# Patient Record
Sex: MALE | Race: WHITE | Employment: UNEMPLOYED | URBAN - METROPOLITAN AREA
[De-identification: names, ages, dates, MRNs, and addresses within clinical notes are randomized per-mention and may not be internally consistent; named-entity substitution may affect disease eponyms.]

---

## 2018-12-30 ENCOUNTER — HOSPITAL ENCOUNTER (INPATIENT)
Age: 23
LOS: 3 days | Discharge: PSYCHIATRIC OR SUBSTANCE USE FACILITY | DRG: 918 | End: 2019-01-02
Attending: EMERGENCY MEDICINE | Admitting: INTERNAL MEDICINE
Payer: COMMERCIAL

## 2018-12-30 DIAGNOSIS — R45.851 SUICIDAL IDEATION: ICD-10-CM

## 2018-12-30 DIAGNOSIS — T50.902A INTENTIONAL DRUG OVERDOSE, INITIAL ENCOUNTER (HCC): Primary | ICD-10-CM

## 2018-12-30 PROBLEM — T50.901A OVERDOSE: Status: ACTIVE | Noted: 2018-12-30

## 2018-12-30 LAB
ALBUMIN SERPL-MCNC: 4.2 G/DL (ref 3.5–5)
ALBUMIN/GLOB SERPL: 1.1 {RATIO} (ref 1.1–2.2)
ALP SERPL-CCNC: 98 U/L (ref 45–117)
ALT SERPL-CCNC: 19 U/L (ref 12–78)
AMPHET UR QL SCN: POSITIVE
ANION GAP SERPL CALC-SCNC: 8 MMOL/L (ref 5–15)
APAP SERPL-MCNC: <2 UG/ML (ref 10–30)
AST SERPL-CCNC: 14 U/L (ref 15–37)
ATRIAL RATE: 103 BPM
BARBITURATES UR QL SCN: NEGATIVE
BASOPHILS # BLD: 0 K/UL (ref 0–0.1)
BASOPHILS NFR BLD: 0 % (ref 0–1)
BENZODIAZ UR QL: POSITIVE
BILIRUB SERPL-MCNC: 0.7 MG/DL (ref 0.2–1)
BUN SERPL-MCNC: 12 MG/DL (ref 6–20)
BUN/CREAT SERPL: 13 (ref 12–20)
CALCIUM SERPL-MCNC: 8.8 MG/DL (ref 8.5–10.1)
CALCULATED P AXIS, ECG09: 69 DEGREES
CALCULATED R AXIS, ECG10: 71 DEGREES
CALCULATED T AXIS, ECG11: 54 DEGREES
CANNABINOIDS UR QL SCN: POSITIVE
CHLORIDE SERPL-SCNC: 102 MMOL/L (ref 97–108)
CK SERPL-CCNC: 226 U/L (ref 39–308)
CO2 SERPL-SCNC: 25 MMOL/L (ref 21–32)
COCAINE UR QL SCN: NEGATIVE
COMMENT, HOLDF: NORMAL
CREAT SERPL-MCNC: 0.92 MG/DL (ref 0.7–1.3)
DIAGNOSIS, 93000: NORMAL
DIFFERENTIAL METHOD BLD: ABNORMAL
DRUG SCRN COMMENT,DRGCM: ABNORMAL
EOSINOPHIL # BLD: 0 K/UL (ref 0–0.4)
EOSINOPHIL NFR BLD: 0 % (ref 0–7)
ERYTHROCYTE [DISTWIDTH] IN BLOOD BY AUTOMATED COUNT: 12.9 % (ref 11.5–14.5)
ETHANOL SERPL-MCNC: <10 MG/DL
GLOBULIN SER CALC-MCNC: 3.8 G/DL (ref 2–4)
GLUCOSE SERPL-MCNC: 105 MG/DL (ref 65–100)
HCT VFR BLD AUTO: 46.6 % (ref 36.6–50.3)
HGB BLD-MCNC: 15.6 G/DL (ref 12.1–17)
IMM GRANULOCYTES # BLD: 0 K/UL (ref 0–0.04)
IMM GRANULOCYTES NFR BLD AUTO: 0 % (ref 0–0.5)
LYMPHOCYTES # BLD: 1.2 K/UL (ref 0.8–3.5)
LYMPHOCYTES NFR BLD: 11 % (ref 12–49)
MAGNESIUM SERPL-MCNC: 1.9 MG/DL (ref 1.6–2.4)
MCH RBC QN AUTO: 30.1 PG (ref 26–34)
MCHC RBC AUTO-ENTMCNC: 33.5 G/DL (ref 30–36.5)
MCV RBC AUTO: 90 FL (ref 80–99)
METHADONE UR QL: NEGATIVE
MONOCYTES # BLD: 1.2 K/UL (ref 0–1)
MONOCYTES NFR BLD: 11 % (ref 5–13)
NEUTS SEG # BLD: 8.8 K/UL (ref 1.8–8)
NEUTS SEG NFR BLD: 78 % (ref 32–75)
NRBC # BLD: 0 K/UL (ref 0–0.01)
NRBC BLD-RTO: 0 PER 100 WBC
OPIATES UR QL: NEGATIVE
P-R INTERVAL, ECG05: 122 MS
PCP UR QL: NEGATIVE
PLATELET # BLD AUTO: 214 K/UL (ref 150–400)
PMV BLD AUTO: 10.8 FL (ref 8.9–12.9)
POTASSIUM SERPL-SCNC: 3.7 MMOL/L (ref 3.5–5.1)
PROT SERPL-MCNC: 8 G/DL (ref 6.4–8.2)
Q-T INTERVAL, ECG07: 296 MS
QRS DURATION, ECG06: 82 MS
QTC CALCULATION (BEZET), ECG08: 387 MS
RBC # BLD AUTO: 5.18 M/UL (ref 4.1–5.7)
SALICYLATES SERPL-MCNC: <1.7 MG/DL (ref 2.8–20)
SAMPLES BEING HELD,HOLD: NORMAL
SODIUM SERPL-SCNC: 135 MMOL/L (ref 136–145)
VENTRICULAR RATE, ECG03: 103 BPM
WBC # BLD AUTO: 11.4 K/UL (ref 4.1–11.1)

## 2018-12-30 PROCEDURE — 99218 HC RM OBSERVATION: CPT

## 2018-12-30 PROCEDURE — 93005 ELECTROCARDIOGRAM TRACING: CPT

## 2018-12-30 PROCEDURE — 80307 DRUG TEST PRSMV CHEM ANLYZR: CPT

## 2018-12-30 PROCEDURE — 96361 HYDRATE IV INFUSION ADD-ON: CPT

## 2018-12-30 PROCEDURE — 85025 COMPLETE CBC W/AUTO DIFF WBC: CPT

## 2018-12-30 PROCEDURE — 80053 COMPREHEN METABOLIC PANEL: CPT

## 2018-12-30 PROCEDURE — 96360 HYDRATION IV INFUSION INIT: CPT

## 2018-12-30 PROCEDURE — 99285 EMERGENCY DEPT VISIT HI MDM: CPT

## 2018-12-30 PROCEDURE — 74011250637 HC RX REV CODE- 250/637: Performed by: INTERNAL MEDICINE

## 2018-12-30 PROCEDURE — 74011250636 HC RX REV CODE- 250/636: Performed by: INTERNAL MEDICINE

## 2018-12-30 PROCEDURE — 65270000029 HC RM PRIVATE

## 2018-12-30 PROCEDURE — 94762 N-INVAS EAR/PLS OXIMTRY CONT: CPT

## 2018-12-30 PROCEDURE — 36415 COLL VENOUS BLD VENIPUNCTURE: CPT

## 2018-12-30 PROCEDURE — 82550 ASSAY OF CK (CPK): CPT

## 2018-12-30 PROCEDURE — 90791 PSYCH DIAGNOSTIC EVALUATION: CPT

## 2018-12-30 PROCEDURE — 74011000250 HC RX REV CODE- 250: Performed by: EMERGENCY MEDICINE

## 2018-12-30 PROCEDURE — 74011250636 HC RX REV CODE- 250/636: Performed by: EMERGENCY MEDICINE

## 2018-12-30 PROCEDURE — 83735 ASSAY OF MAGNESIUM: CPT

## 2018-12-30 RX ORDER — SODIUM CHLORIDE 0.9 % (FLUSH) 0.9 %
5-10 SYRINGE (ML) INJECTION EVERY 8 HOURS
Status: DISCONTINUED | OUTPATIENT
Start: 2018-12-30 | End: 2019-01-02 | Stop reason: HOSPADM

## 2018-12-30 RX ORDER — QUETIAPINE FUMARATE 25 MG/1
25-50 TABLET, FILM COATED ORAL
COMMUNITY
End: 2019-01-04

## 2018-12-30 RX ORDER — SODIUM CHLORIDE 0.9 % (FLUSH) 0.9 %
5-10 SYRINGE (ML) INJECTION AS NEEDED
Status: DISCONTINUED | OUTPATIENT
Start: 2018-12-30 | End: 2019-01-02 | Stop reason: HOSPADM

## 2018-12-30 RX ORDER — ALPRAZOLAM 0.5 MG/1
0.5 TABLET ORAL
COMMUNITY
End: 2019-01-04

## 2018-12-30 RX ORDER — ALPRAZOLAM 0.5 MG/1
0.5 TABLET ORAL
Status: DISCONTINUED | OUTPATIENT
Start: 2018-12-30 | End: 2018-12-31

## 2018-12-30 RX ORDER — ONDANSETRON 2 MG/ML
4 INJECTION INTRAMUSCULAR; INTRAVENOUS
Status: DISCONTINUED | OUTPATIENT
Start: 2018-12-30 | End: 2019-01-02 | Stop reason: HOSPADM

## 2018-12-30 RX ORDER — DEXTROAMPHETAMINE SACCHARATE, AMPHETAMINE ASPARTATE, DEXTROAMPHETAMINE SULFATE AND AMPHETAMINE SULFATE 1.25; 1.25; 1.25; 1.25 MG/1; MG/1; MG/1; MG/1
5 TABLET ORAL DAILY
COMMUNITY
End: 2019-01-02

## 2018-12-30 RX ADMIN — ALPRAZOLAM 0.5 MG: 0.25 TABLET ORAL at 20:10

## 2018-12-30 RX ADMIN — Medication 10 ML: at 23:31

## 2018-12-30 RX ADMIN — SODIUM CHLORIDE 1000 ML: 900 INJECTION, SOLUTION INTRAVENOUS at 06:11

## 2018-12-30 RX ADMIN — SODIUM CHLORIDE 1000 ML: 900 INJECTION, SOLUTION INTRAVENOUS at 09:03

## 2018-12-30 RX ADMIN — SODIUM CHLORIDE 1000 ML: 900 INJECTION, SOLUTION INTRAVENOUS at 05:01

## 2018-12-30 RX ADMIN — ONDANSETRON 4 MG: 2 INJECTION INTRAMUSCULAR; INTRAVENOUS at 23:31

## 2018-12-30 RX ADMIN — ALPRAZOLAM 0.5 MG: 0.25 TABLET ORAL at 16:01

## 2018-12-30 RX ADMIN — POISON ADSORBENT 50 G: 50 SUSPENSION ORAL at 05:19

## 2018-12-30 RX ADMIN — ALPRAZOLAM 0.5 MG: 0.25 TABLET ORAL at 09:30

## 2018-12-30 NOTE — ED NOTES
RN spoke with [place] with [facility], they recommend administering activated charcoal.  Also recommends IV fluids and Ativan PRN and observe for 4-6 hours. Primary RN and MD notified.

## 2018-12-30 NOTE — ED PROVIDER NOTES
The history is provided by the patient, a relative and a parent. No  was used. Drug Overdose This is a new problem. The current episode started 1 to 2 hours ago. The problem occurs constantly. The problem has not changed since onset. Pertinent negatives include no chest pain, no abdominal pain, no headaches and no shortness of breath. Nothing aggravates the symptoms. Nothing relieves the symptoms. He has tried nothing for the symptoms. The treatment provided no relief. Mental Health Problem This is a recurrent problem. The problem has been gradually worsening. Associated symptoms include self-injury. Pertinent negatives include no confusion, no somnolence, no seizures, no unresponsiveness, no weakness, no agitation, no delusions, no hallucinations, no violence, no tingling and no numbness. Mental status baseline is normal.  Risk factors include alcohol intake, overdose, illicit drug use and the patient not taking meds correctly. His past medical history does not include diabetes, seizures, liver disease, CVA, TIA, AIDS, hypertension, COPD, depression, dementia, psychotropic medication treatment, head trauma or heart disease. Past Medical History:  
Diagnosis Date  Anxiety  Depression Past Surgical History:  
Procedure Laterality Date  HX TONSIL AND ADENOIDECTOMY  INTUBATE PATIENT  10/6/2015 History reviewed. No pertinent family history. Social History Socioeconomic History  Marital status: SINGLE Spouse name: Not on file  Number of children: Not on file  Years of education: Not on file  Highest education level: Not on file Social Needs  Financial resource strain: Not on file  Food insecurity - worry: Not on file  Food insecurity - inability: Not on file  Transportation needs - medical: Not on file  Transportation needs - non-medical: Not on file Occupational History  Not on file Tobacco Use  
  Smoking status: Never Smoker Substance and Sexual Activity  Alcohol use: Yes Comment: social  
 Drug use: Yes Types: Marijuana  Sexual activity: Not on file Other Topics Concern  Not on file Social History Narrative  Not on file ALLERGIES: Patient has no known allergies. Review of Systems Constitutional: Negative for activity change, chills and fever. HENT: Negative for nosebleeds, sore throat, trouble swallowing and voice change. Eyes: Negative for visual disturbance. Respiratory: Negative for shortness of breath. Cardiovascular: Negative for chest pain and palpitations. Gastrointestinal: Negative for abdominal pain, constipation, diarrhea and nausea. Genitourinary: Negative for difficulty urinating, dysuria, hematuria and urgency. Musculoskeletal: Negative for back pain, neck pain and neck stiffness. Skin: Negative for color change. Allergic/Immunologic: Negative for immunocompromised state. Neurological: Negative for dizziness, tingling, seizures, syncope, weakness, light-headedness, numbness and headaches. Psychiatric/Behavioral: Positive for self-injury. Negative for agitation, behavioral problems, confusion, hallucinations and suicidal ideas. Vitals:  
 12/30/18 0445 BP: 129/67 Pulse: (!) 107 Resp: 20 Temp: 98.5 °F (36.9 °C) SpO2: 97% Weight: 50.9 kg (112 lb 3.4 oz) Height: 5' 4.75\" (1.645 m) Physical Exam  
Constitutional: He is oriented to person, place, and time. He appears well-developed and well-nourished. No distress. HENT:  
Head: Normocephalic and atraumatic. Eyes: Pupils are equal, round, and reactive to light. Neck: Normal range of motion. Neck supple. Cardiovascular: Regular rhythm and normal heart sounds. Tachycardia present. Exam reveals no gallop and no friction rub. No murmur heard. Pulmonary/Chest: Effort normal and breath sounds normal. No respiratory distress. He has no wheezes. Abdominal: Soft. Bowel sounds are normal. He exhibits no distension. There is no tenderness. There is no rebound and no guarding. Musculoskeletal: Normal range of motion. Neurological: He is alert and oriented to person, place, and time. Skin: Skin is warm. No rash noted. He is not diaphoretic. Psychiatric: His speech is normal. Judgment normal. His mood appears anxious. His affect is blunt. He is agitated. Cognition and memory are normal. He expresses suicidal ideation. He expresses suicidal plans. Nursing note and vitals reviewed. MDM Number of Diagnoses or Management Options Diagnosis management comments: ED EKG interpretation: 
Rhythm: sinus tachycardia; and regular . Rate (approx.): 103; Axis: normal; P wave: normal; QRS interval: normal ; ST/T wave: normal; Other findings: abnormal ekg. This EKG was interpreted by Adrian Stoner MD,ED Physician. This is a 68-year-old male with past medical history, review of systems, physical exam as above, presenting with complaints of suicidal ideation, and prescription drug overdose. Patient states long-term issues, with depression, anxiety, endorses noncompliance with medications, states that approximately one hour prior to arrival he consumed (20) 5 mg tablets of Adderall. Patient versus previous suicidality, and suicide attempt by ingestion, previous admission for the same. He denies auditory or visual hallucinations, he endorses marijuana use, alcohol use, denies tobacco use. Physical exam remarkable for tearful, well-appearing young male, in no acute stress, noted to be mildly tachycardic, with clear breath sounds, soft nontender abdomen, without hyperreflexia. Plan to obtain CMP, CBC chem EKG, salicylates, acetaminophen, UDS, blood alcohol, consult with psychiatry, and poison control.  We will reassess, and make a disposition based on the patient's diagnostics and response to therapy. 
 
 
5:22 AM 
 Poison control recommends activated charcoal, on top of orders already placed, observation of 4-6 hours for resolution of tachycardia, followed by psychiatric admission, and less tachycardia requires medical admission. Procedures 6:45 AM 
Patient discussed with Dr. Ashley Zaragoza [facility]), he will evaluate the patient for admission.

## 2018-12-30 NOTE — ROUTINE PROCESS
TRANSFER - OUT REPORT: 
 
Verbal report given to ShikhaRN(name) on Bridget Hong  being transferred to ICU(unit) for routine progression of care Report consisted of patients Situation, Background, Assessment and  
Recommendations(SBAR). Information from the following report(s) SBAR, Kardex, ED Summary and Recent Results was reviewed with the receiving nurse. Lines:  
Peripheral IV 12/30/18 Left Antecubital (Active) Opportunity for questions and clarification was provided. Patient transported with: 
 Monitor Registered Nurse

## 2018-12-30 NOTE — CONSULTS
PSYCHIATRIC PROGRESS NOTE         Patient Name  Raoul Covert   Date of Birth 1995   [facility] [id number]   Medical Record Number  [id number]      Age  26 y.o. PCP Hillery Aase, MD   Admit date:  12/30/2018    Room Number  7113/01  @ [facility]   Date of Service  12/30/2018             E & M PROGRESS NOTE:         HISTORY       CC/HISTORY OF PRESENT ILLNESS/INTERVAL HISTORY:  (reviewed/updated 12/30/2018). per initial evaluation:       Raoul Covernancy presents/reports/evidences the following emotional symptoms today, 12/30/2018:  depression, anxiety and overdosed on Adderall and Seroquel . The above symptoms have been present for few weeks . These symptoms are of severe severity. The symptoms are constant  in nature. Additional symptomatology include depression worse, difficulty sleeping, feeling depressed and feeling suicidal.      SIDE EFFECTS: (reviewed/updated 12/30/2018)  None reported or admitted to. No noted toxicity with use of Depakote/Tegretol/lithium/Clozaril/TCAs   ALLERGIES:(reviewed/updated 12/30/2018)  No Known Allergies   MEDICATIONS PRIOR TO ADMISSION:(reviewed/updated 12/30/2018)  Medications Prior to Admission   Medication Sig    dextroamphetamine-amphetamine (ADDERALL) 5 mg tablet Take 5 mg by mouth daily. May take 2 tablets twice a day if needed; usually takes when works.  ALPRAZolam (XANAX) 0.5 mg tablet Take 0.5 mg by mouth three (3) times daily as needed for Anxiety.  vortioxetine (TRINTELLIX) 10 mg tablet Take 10 mg by mouth daily.  QUEtiapine (SEROQUEL) 25 mg tablet Take 25 mg by mouth. PAST MEDICAL HISTORY: Past medical history from the initial psychiatric evaluation has been reviewed (reviewed/updated 12/30/2018) with no additional updates (I asked patient and no additional past medical history provided).  Past Medical History:   Diagnosis Date    Anxiety     Depression      Past Surgical History:   Procedure Laterality Date    HX TONSIL AND ADENOIDECTOMY      INTUBATE PATIENT  10/6/2015           SOCIAL HISTORY: Social history from the initial psychiatric evaluation has been reviewed (reviewed/updated 12/30/2018) with no additional updates (I asked patient and no additional social history provided). Social History     Socioeconomic History    Marital status: SINGLE     Spouse name: Not on file    Number of children: Not on file    Years of education: Not on file    Highest education level: Not on file   Social Needs    Financial resource strain: Not on file    Food insecurity - worry: Not on file    Food insecurity - inability: Not on file    Transportation needs - medical: Not on file   [employer] needs - non-medical: Not on file   Occupational History    Not on file   Tobacco Use    Smoking status: Never Smoker   Substance and Sexual Activity    Alcohol use: Yes     Comment: social    Drug use: Yes     Types: Marijuana    Sexual activity: Not on file   Other Topics Concern    Not on file   Social History Narrative    Not on file      FAMILY HISTORY: Family history from the initial psychiatric evaluation has been reviewed (reviewed/updated 12/30/2018) with no additional updates (I asked patient and no additional family history provided). History reviewed. No pertinent family history. REVIEW OF SYSTEMS: (reviewed/updated 12/30/2018)  Appetite:improved   Sleep: good   All other Review of Systems:      History obtained from the patient         [facility] [place]    [facility] FINDINGS ARE WITHIN NORMAL LIMITS (WNL) UNLESS OTHERWISE STATED BELOW. Orientation oriented to time, place and person   Vital Signs (BP,Pulse, Temp) See below (reviewed 12/30/2018); vital signs are WNL if not listed below.    Gait and Station Stable/steady, no ataxia   Abnormal Muscular Movements, Tone, and Behavior No EPS, no Tardive Dyskinesia, no abnormal muscular movements; wnl tone   Relations cooperative   General Appearance:  age appropriate   Language No aphasia or dysarthria   Speech:  normal pitch and normal volume   Thought Processes logical, wnl rate of thoughts, good abstract reasoning and computation   Thought Associations logical   Thought Content free of delusions and free of hallucinations   Suicidal Ideations plan and intention   Homicidal Ideations no plan  and no intention   Mood:  depressed   Affect:  depressed   Memory recent  adequate   Memory remote:  adequate   Concentration/Attention:  adequate   Fund of Knowledge average   Insight:  good   Reliability fair   Judgment:  fair        VITALS:     Patient Vitals for the past 24 hrs:   Temp Pulse Resp BP SpO2   12/30/18 1400  (!) 102 21 130/88 98 %   12/30/18 1300  95 23 137/70 99 %   12/30/18 1200 97.8 °F (36.6 °C) (!) 115 25 137/75 97 %   12/30/18 1100  (!) 108 22 139/77 99 %   12/30/18 1000  (!) 113 9 144/70 100 %   12/30/18 0918 98 °F (36.7 °C) (!) 102 18 136/71 99 %   12/30/18 0830  (!) 118  140/71 98 %   12/30/18 0800 97.6 °F (36.4 °C) (!) 117 17 133/84 98 %   12/30/18 0730  (!) 123 16 133/59 99 %   12/30/18 0715  (!) 122 23 146/80 99 %   12/30/18 0700  (!) 118 28 148/61 100 %   12/30/18 0630  (!) 133 21 (!) 130/102 99 %   12/30/18 0600  (!) 131 17 145/73 100 %   12/30/18 0545  (!) 112 16 146/69 99 %   12/30/18 0530  (!) 121 14 145/77 100 %   12/30/18 0515  (!) 108 15 138/72 99 %   12/30/18 0500  (!) 123 19 143/83 99 %   12/30/18 0445 98.5 °F (36.9 °C) (!) 107 20 129/67 97 %            DATA     LABORATORY DATA:(reviewed/updated 12/30/2018)  Recent Results (from the past 24 hour(s))   EKG, 12 LEAD, INITIAL    Collection Time: 12/30/18  4:52 AM   Result Value Ref Range    Ventricular Rate 103 BPM    Atrial Rate 103 BPM    P-R Interval 122 ms    QRS Duration 82 ms    Q-T Interval 296 ms    QTC Calculation (Bezet) 387 ms    Calculated P Axis 69 degrees    Calculated R Axis 71 degrees    Calculated T Axis 54 degrees    Diagnosis       Sinus tachycardia  No previous ECGs available  Confirmed by Chary Carlos M.D., Neema Espinal ([id number]) on 12/30/2018 9:48:46 AM     SAMPLES BEING HELD    Collection Time: 12/30/18  5:04 AM   Result Value Ref Range    SAMPLES BEING HELD 1BLUE     COMMENT        Add-on orders for these samples will be processed based on acceptable specimen integrity and analyte stability, which may vary by analyte. CBC WITH AUTOMATED DIFF    Collection Time: 12/30/18  5:04 AM   Result Value Ref Range    WBC 11.4 (H) 4.1 - 11.1 K/uL    RBC 5.18 4.10 - 5.70 M/uL    HGB 15.6 12.1 - 17.0 g/dL    HCT 46.6 36.6 - 50.3 %    MCV 90.0 80.0 - 99.0 FL    MCH 30.1 26.0 - 34.0 PG    MCHC 33.5 30.0 - 36.5 g/dL    RDW 12.9 11.5 - 14.5 %    PLATELET 062 528 - 850 K/uL    MPV 10.8 8.9 - 12.9 FL    NRBC 0.0 0  WBC    ABSOLUTE NRBC 0.00 0.00 - 0.01 K/uL    NEUTROPHILS 78 (H) 32 - 75 %    LYMPHOCYTES 11 (L) 12 - 49 %    MONOCYTES 11 5 - 13 %    EOSINOPHILS 0 0 - 7 %    BASOPHILS 0 0 - 1 %    IMMATURE GRANULOCYTES 0 0.0 - 0.5 %    ABS. NEUTROPHILS 8.8 (H) 1.8 - 8.0 K/UL    ABS. LYMPHOCYTES 1.2 0.8 - 3.5 K/UL    ABS. MONOCYTES 1.2 (H) 0.0 - 1.0 K/UL    ABS. EOSINOPHILS 0.0 0.0 - 0.4 K/UL    ABS. BASOPHILS 0.0 0.0 - 0.1 K/UL    ABS. IMM. GRANS. 0.0 0.00 - 0.04 K/UL    DF AUTOMATED     METABOLIC PANEL, COMPREHENSIVE    Collection Time: 12/30/18  5:04 AM   Result Value Ref Range    Sodium 135 (L) 136 - 145 mmol/L    Potassium 3.7 3.5 - 5.1 mmol/L    Chloride 102 97 - 108 mmol/L    CO2 25 21 - 32 mmol/L    Anion gap 8 5 - 15 mmol/L    Glucose 105 (H) 65 - 100 mg/dL    BUN 12 6 - 20 MG/DL    Creatinine 0.92 0.70 - 1.30 MG/DL    BUN/Creatinine ratio 13 12 - 20      GFR est AA >60 >60 ml/min/1.73m2    GFR est non-AA >60 >60 ml/min/1.73m2    Calcium 8.8 8.5 - 10.1 MG/DL    Bilirubin, total 0.7 0.2 - 1.0 MG/DL    ALT (SGPT) 19 12 - 78 U/L    AST (SGOT) 14 (L) 15 - 37 U/L    Alk.  phosphatase 98 45 - 117 U/L    Protein, total 8.0 6.4 - 8.2 g/dL    Albumin 4.2 3.5 - 5.0 g/dL Globulin 3.8 2.0 - 4.0 g/dL    A-G Ratio 1.1 1.1 - 2.2     MAGNESIUM    Collection Time: 12/30/18  5:04 AM   Result Value Ref Range    Magnesium 1.9 1.6 - 2.4 mg/dL   ETHYL ALCOHOL    Collection Time: 12/30/18  5:04 AM   Result Value Ref Range    ALCOHOL(ETHYL),SERUM <10 <10 MG/DL   ACETAMINOPHEN    Collection Time: 12/30/18  5:04 AM   Result Value Ref Range    Acetaminophen level <2 (L) 10 - 30 ug/mL   SALICYLATE    Collection Time: 12/30/18  5:04 AM   Result Value Ref Range    Salicylate level <9.6 (L) 2.8 - 20.0 MG/DL   CK    Collection Time: 12/30/18  5:04 AM   Result Value Ref Range     39 - 308 U/L   DRUG SCREEN, URINE    Collection Time: 12/30/18  6:14 AM   Result Value Ref Range    AMPHETAMINES POSITIVE (A) NEG      BARBITURATES NEGATIVE  NEG      BENZODIAZEPINES POSITIVE (A) NEG      COCAINE NEGATIVE  NEG      METHADONE NEGATIVE  NEG      OPIATES NEGATIVE  NEG      PCP(PHENCYCLIDINE) NEGATIVE  NEG      THC (TH-CANNABINOL) POSITIVE (A) NEG      Drug screen comment (NOTE)      No results found for: VALF2, VALAC, VALP, VALPR, DS6, CRBAM, CRBAMP, CARB2, XCRBAM  No results found for: LITHM       MEDICATIONS     ALL MEDICATIONS:   Current Facility-Administered Medications   Medication Dose Route Frequency    ALPRAZolam (XANAX) tablet 0.5 mg  0.5 mg Oral TID PRN    sodium chloride (NS) flush 5-10 mL  5-10 mL IntraVENous Q8H    sodium chloride (NS) flush 5-10 mL  5-10 mL IntraVENous PRN    ondansetron (ZOFRAN) injection 4 mg  4 mg IntraVENous Q4H PRN      SCHEDULED MEDICATIONS:   Current Facility-Administered Medications   Medication Dose Route Frequency    sodium chloride (NS) flush 5-10 mL  5-10 mL IntraVENous Q8H            ASSESSMENT & PLAN     The patient, Miguel Angel Whyte, is a 26 y.o.  male who presents at this time for treatment of the following diagnoses: (reviewed/updated 12/30/2018)  Patient Active Hospital Problem List:   Overdose (12/30/2018)    Assessment: overdosed on Adderall and Seroquel     Plan: need inpatient psychiatry service , patient agreed , please transfer to psychiatry after he is medically cleared           I will continue to follow up with patient as deemed appropriate. I will continue to monitor blood levels (Depakote, Tegretol, lithium, clozapine---a drug with a narrow therapeutic index= NTI) and associated labs for drug therapy implemented that require intense monitoring for toxicity as deemed appropriate base on current medication side effects and pharmacodynamically determined drug 1/2 lives. I will continue to order blood tests/labs and diagnostic tests as deemed appropriate and review results as they become available (see orders for details and above listed lab/test results). I will order psychiatric records from previous [facility] hospitals to further elucidate the nature of patient's psychopathology and review once available. I will gather additional collateral information from friends, family and o/p treatment team to further elucidate the nature of patient's psychopathology and baselline level of psychiatric functioning.     Complete current electronic health record for patient has been reviewed today including consultant notes, ancillary staff notes, nurses and psychiatric tech notes        Signed By:   Keshawn Farah MD  12/30/2018

## 2018-12-30 NOTE — PROGRESS NOTES
TRANSFER - IN REPORT: 
 
Verbal report received from Gianna(name) on Henry Armendariz  being received from ED(unit) for routine progression of care Report consisted of patients Situation, Background, Assessment and  
Recommendations(SBAR). Information from the following report(s) SBAR, Kardex, ED Summary and MAR was reviewed with the receiving nurse. Opportunity for questions and clarification was provided. Assessment completed upon patients arrival to unit and care assumed.

## 2018-12-30 NOTE — ED NOTES
Bedside and Verbal shift change report given to Suzanne Cervantes (oncoming nurse) by Jacob Kelley (offgoing nurse). Report included the following information SBAR, ED Summary and Recent Results.

## 2018-12-30 NOTE — PROGRESS NOTES
1410-Spoke with Poison control center, updated on pt status. Poison control stated no further intervention is needed and that they are signing off.

## 2018-12-30 NOTE — ED NOTES
Received call from Javy Carter and updated him on status of pt and pt's bed placement. Told to continue treatment and monitor pt.

## 2018-12-30 NOTE — ED NOTES
Pt changed into hospital gown.  Belongings secured.  HPD aware of pt.  Line of site maintained. Family stepped away from bedside for evaluation by [facility].

## 2018-12-30 NOTE — H&P
History and Physical 
Primary Care Provider: Selwyn Tabor MD 
 
Subjective:  
 
Jai Middleton is a 26 y.o. male with a history of anxiety and depression who presented to the ED after he overdosed on Adderrall. Seen with his parents at the bedside. He states that he has been having a lot of stress and anxiety lately. He went to work and then came home and decided to try to end his life by taking too many pills, but then immediately realized that it was a mistake. His parent state it was a \"cry for help. \" This is his second overdose attempt. The last being approx. 3 years ago when he took Lamictal.  
 
Per his parents he has not been taking his Psych meds as he is supposed to. He is on PRN Xanax for anxiety and refuses to take anything else. He has been seeing a psychiatrist intermittently, but needs to follow more closely with a counselor. His mother states that this may be what he needs to \"get is head straight. \" 
 
Poison control was contacted by the ED. Activated charcoal was recommended. Psych did not feel comfortable admitting him because of his tachycardia. Review of Systems: A comprehensive review of systems was negative except for that written in the History of Present Illness. Past Medical History:  
Diagnosis Date  Anxiety  Depression Past Surgical History:  
Procedure Laterality Date  HX TONSIL AND ADENOIDECTOMY  INTUBATE PATIENT  10/6/2015 Prior to Admission medications Medication Sig Start Date End Date Taking? Authorizing Provider  
dextroamphetamine-amphetamine (ADDERALL) 5 mg tablet Take 5 mg by mouth daily. May take 2 tablets twice a day if needed; usually takes when works. Yes Allan, MD Ish  
ALPRAZolam (XANAX) 0.5 mg tablet Take 0.5 mg by mouth three (3) times daily as needed for Anxiety. Yes Allan, MD Ish  
vortioxetine (TRINTELLIX) 10 mg tablet Take 10 mg by mouth daily.    Yes Ish Lemus MD  
 QUEtiapine (SEROQUEL) 25 mg tablet Take 25 mg by mouth. Other, MD Ish  
 
No Known Allergies History reviewed. No pertinent family history. SOCIAL HISTORY: 
Social History Socioeconomic History  Marital status: SINGLE Spouse name: Not on file  Number of children: Not on file  Years of education: Not on file  Highest education level: Not on file Social Needs  Financial resource strain: Not on file  Food insecurity - worry: Not on file  Food insecurity - inability: Not on file  Transportation needs - medical: Not on file  Transportation needs - non-medical: Not on file Occupational History  Not on file Tobacco Use  Smoking status: Never Smoker Substance and Sexual Activity  Alcohol use: Yes Comment: social  
 Drug use: Yes Types: Marijuana  Sexual activity: Not on file Other Topics Concern  Not on file Social History Narrative  Not on file Objective:  
 
Physical Exam:  
Visit Vitals /77 Pulse (!) 108 Temp 98 °F (36.7 °C) Resp 22 Ht 5' 4.75\" (1.645 m) Wt 50.9 kg (112 lb 3.4 oz) SpO2 99% BMI 18.82 kg/m² General:  Alert, cooperative. Head:  Normocephalic. Eyes:  Conjunctivae/corneas injected. Nose: Nares normal. Septum midline. Throat: Lips, mucosa, and tongue with dark staining. Neck: Supple, symmetrical, trachea midline. Lungs:   Clear to auscultation bilaterally. Chest wall:  No tenderness or deformity. Heart:  Regular rhythm and increased rate, S1, S2 normal, no murmur. Abdomen:   Soft, non-tender. Bowel sounds normal.   
Extremities: Extremities normal, atraumatic, no edema. Pulses: 2+ and symmetric all extremities. Neurologic: Non-focal. 
Psych: Anxious affect. Tangential speech. Data Review: All diagnostic labs and studies have been reviewed. Assessment:  
 
Active Problems: 
  Overdose (12/30/2018) Plan: Overdose: Carmen Dale is a 26 y.o. male with a history of anxiety and depression who presented to the ED after he overdosed on Adderrall.  
-admit to tele bed under observation 
-Poison control was contacted by the ED. Activated charcoal was recommended 
-Psych did not feel comfortable admitting him because of his tachycardia. Transfer there when this resolves 
-continue fluid boluses as needed. Resume diet 
-sitter at the bedside if family not present 
-Psych consult ASAP Suicidal Ideation: See above. Will likely need inpatient Psych admission. Depression/Anxiety: Continue PRN benzos since they are a home med and do not want withdrawal symptoms. -further meds per Psych Full Code SCDs Signed By: Isidro Gitelman, MD   
 December 30, 2018

## 2018-12-30 NOTE — ED TRIAGE NOTES
\"I'm not happy with the way my life is going. \" Mom states pt. Woke her up PTA stating he had taken 25-30 Adderall in an attempt to kill himself. Pt. Very tearful,crying. States he had attempted suicide 3 years ago with am OD.

## 2018-12-30 NOTE — BSMART NOTE
Comprehensive Assessment Form Part 1 Section I - Disposition Axis I - Major Depressive d/o, recurrent, severe, without psychotic features THC and alcohol abuse Axis II - deferred Axis III - none reported Axis IV - noncompliant with medications, has an aversion to seeing a counselor [place] [facility] - 39 The Medical Doctor to Psychiatrist conference was not completed. Medical doctor is in agreement with psychiatrist disposition because this counselor conveyed to ED physician the recommendation of the on-call psychiatrist and they concurred. The plan is to admit patient medically. The on-call Psychiatrist was Dr. Javier Ortega (not consulted due to medical admission). The admitting Psychiatrist will be Dr. Finn. The admitting Diagnosis is Major Depressive d/o, recurrent, severe, without psychotic features The Payor source is [employer] - Brandie Cooler. Section II - Integrated Summary Summary:    
Patient is a 27 yo white male who arrives at ED via EMS accompanied by mother/Antionette and stepfather/W[place] with chief complaint of depression and suicide attempt by overdose saying, \"I'm not happy with the way my life is going. I just had a breakup with my girlfriend. I'm always thinking about her. I don't know what's wrong with me. I just feel like I can't handle stuff. \" Mother states that patient woke her up saying he had taken 25-30/ 5mg tabs Adderall at about 0415 in an attempt to kill himself. Patient was very tearful and crying during assessment. Patient reports a previous suicide attempt by overdose (Lamictal) 3 years ago and was medically admitted for about 8 days. He has never had a psychiatric admission. Patient reports the most tragic thing that's ever happened is his counselor, who he saw between the age of 17 and 18, committed suicide. One year after counselor's patient attempted suicide by overdose on Lamictal as noted above.  Patient reports struggling with depression and anxiety since age 17 but mostly depression. He believes recent breakup with girlfriend triggered current bout of depression. He says, \"I don't know why I got so upset about it. I was only with her for about 3 months but she was perfect. \" 
Patient admits to occasional alcohol use and daily marijuana use. He reports smoking marijuana earlier this evening prior to suicide attempt. He also reports poor sleep for the past week. Patient denies homicidal ideation, denies auditory/visual hallucinations, is not delusional, and is oriented X4. Patient's ETOH is <10 and drug screen is pending. Patient is followed by psychiatrist/Dr Quynh Hidalgo but does not see a counselor saying, \"I have a hard time seeing a counselor after what happened. \" He is prescribed Adderall 5mg; Xanax 0.5mg; and Seroquel 25mg but is not consistent with taking his meds as prescribed. He is currently employed as a  at [employer] and likes his job. He finished high school and went to one year of college in [place] before dropping out and returning to [place] to live with his mother and step father.] RN spoke with [employer] who recommended administering activated charcoal, IV fluids, Ativan PRN, and observe for 4-6 hours ([id number]). Charcoal was administered at 0516. Patient is amenable to a voluntary psychiatric admission. 9881 - ED DR decided to admit patient medically due to increased heart rate. The patient has demonstrated mental capacity to provide informed consent. The information is given by the patient, parent and past medical records. The Chief Complaint is depression. The Precipitant Factors are recent break up with girlfriend, THC and alcohol use. Previous Hospitalizations: none noted The patient has not previously been in restraints. Current Psychiatrist and/or  is psychiatrist/Dr Quynh Hidalgo. Lethality Assessment: The potential for suicide noted by the following: intent, previous history of attempts which occurred about 3 years ago in the form(s) of drug overdose (Lamictal), current attempt and current substance abuse. The potential for homicide is not noted. The patient has not been a perpetrator of sexual or physical abuse. There are not pending charges. The patient is felt to be at risk for self harm or harm to others. The attending nurse was advised no further monitoring is necessary at this time (parents at bedside). Section III - Psychosocial 
The patient's overall mood and attitude is depressed and anxious. Feelings of helplessness and hopelessness are observed by crying inconsolably. Generalized anxiety is observed by pressured speech. Panic is not observed. Phobias are not observed. Obsessive compulsive tendencies are not observed. Section IV - Mental Status Exam 
The patient's appearance shows no evidence of impairment. The patient's behavior is guarded. The patient is oriented to time, place, person and situation. The patient's speech is pressured. The patient's mood is depressed, is anxious and displays anhedonia. The range of affect is labile. The patient's thought content demonstrates no evidence of impairment. The thought process shows no evidence of impairment. The patient's perception shows no evidence of impairment. The patient's memory shows no evidence of impairment. The patient's appetite shows no evidence of impairment. The patient's sleep has evidence of insomnia. The patient's insight shows no evidence of impairment. The patient's judgement shows no evidence of impairment. Section V - Substance Abuse The patient is using substances. The patient is using alcohol for 1-5 years with last use on 12/29/18 and cannabis by inhalation for 1-5 years with last use on 12/30/18. The patient has experienced the following withdrawal symptoms: N/A. Section VI - Living Arrangements The patient is single. The patient lives with a parent. The patient has no children. The patient does plan to return home upon discharge. The patient does not have legal issues pending. The patient's source of income comes from employment and family. Taoism and cultural practices have not been voiced at this time. The patient's greatest support comes from parents and Dr Priscilla Armstrong and this person will be involved with the treatment. The patient has been in an event described as horrible or outside the realm of ordinary life experience either currently or in the past. 
The patient has not been a victim of sexual/physical abuse. Section VII - Other Areas of Clinical Concern The highest grade achieved is 1 year college with the overall quality of school experience being described as ok. The patient is currently employed and speaks [place] as a primary language. The patient has no communication impairments affecting communication. The patient's preference for learning can be described as: can read and write adequately.   The patient's hearing is normal.  The patient's vision is normal. 
 
 
Paz Padilla, BEHZAD

## 2018-12-30 NOTE — BSMART NOTE
This counselor provided multiple out-patient counseling resources to patient's mother who says she will facilitate scheduling an appointment for patient upon discharge from hospital.

## 2018-12-31 PROCEDURE — 74011250637 HC RX REV CODE- 250/637: Performed by: INTERNAL MEDICINE

## 2018-12-31 PROCEDURE — 65270000029 HC RM PRIVATE

## 2018-12-31 PROCEDURE — 99218 HC RM OBSERVATION: CPT

## 2018-12-31 RX ORDER — ALPRAZOLAM 0.5 MG/1
1 TABLET ORAL
Status: DISCONTINUED | OUTPATIENT
Start: 2018-12-31 | End: 2019-01-02 | Stop reason: HOSPADM

## 2018-12-31 RX ORDER — ALPRAZOLAM 0.5 MG/1
1 TABLET ORAL
Status: DISCONTINUED | OUTPATIENT
Start: 2018-12-31 | End: 2018-12-31

## 2018-12-31 RX ORDER — ALPRAZOLAM 0.5 MG/1
1 TABLET ORAL ONCE
Status: COMPLETED | OUTPATIENT
Start: 2018-12-31 | End: 2018-12-31

## 2018-12-31 RX ORDER — QUETIAPINE FUMARATE 25 MG/1
25 TABLET, FILM COATED ORAL
Status: DISCONTINUED | OUTPATIENT
Start: 2018-12-31 | End: 2019-01-02 | Stop reason: HOSPADM

## 2018-12-31 RX ADMIN — ALPRAZOLAM 1 MG: 0.5 TABLET ORAL at 13:40

## 2018-12-31 RX ADMIN — ALPRAZOLAM 0.5 MG: 0.25 TABLET ORAL at 10:28

## 2018-12-31 RX ADMIN — Medication 10 ML: at 07:22

## 2018-12-31 RX ADMIN — Medication 10 ML: at 13:39

## 2018-12-31 RX ADMIN — QUETIAPINE FUMARATE 25 MG: 25 TABLET ORAL at 21:09

## 2018-12-31 RX ADMIN — ALPRAZOLAM 0.5 MG: 0.25 TABLET ORAL at 04:12

## 2018-12-31 RX ADMIN — ALPRAZOLAM 1 MG: 0.5 TABLET ORAL at 19:25

## 2018-12-31 RX ADMIN — Medication 10 ML: at 21:09

## 2018-12-31 NOTE — PROGRESS NOTES
Pt discussed in [address] rounds. This pt will most likely go to the behavioral health unit when he is medically stable. Kasia Oliveira

## 2018-12-31 NOTE — PROGRESS NOTES
VS deferred to allow patient to sleep as patient c/o not slept in last couple nights. Patient stable; NSR on monitor at 70 bpm. Sitter at bedside. Will obtain VS when patient awakes. [facility] Bedside shift change report given to Annia Chaparro (oncoming nurse) by Elham Pina (offgoing nurse). Report included the following information SBAR, Kardex, Procedure Summary, MAR, Accordion, Recent Results and Cardiac Rhythm NSR/ST.

## 2018-12-31 NOTE — PROGRESS NOTES
Hospitalist Progress Note Ryne Martinez MD 
Call  Date of Service:  2018 NAME:  Garfield Franklin :  1995 MRN:  [id number] Admission Summary:  
2018 excerpts of h&p note: \"Arun Jeffery is a 26 y.o. male with a history of anxiety and depression who presented to the ED after he overdosed on Adderrall. Seen with his parents at the bedside. He states that he has been having a lot of stress and anxiety lately. He went to work and then came home and decided to try to end his life by taking too many pills, but then immediately realized that it was a mistake. His parent state it was a \"cry for help. \" This is his second overdose attempt. The last being approx. 3 years ago when he took Lamictal.  
Per his parents he has not been taking his Psych meds as he is supposed to. He is on PRN Xanax for anxiety and refuses to take anything else. He has been seeing a psychiatrist intermittently, but needs to follow more closely with a counselor. His mother states that this may be what he needs to \"get is head straight. \" 
Poison control was contacted by the ED. Activated charcoal was recommended. Psych did not feel comfortable admitting him because of his tachycardia. \" Interval history / Subjective:  
 
Pt already seen by psychiatry, refer to above regarding admitting hospitalist note regarding. Pt very anxious, tachycardic without chest pain in 27 y/o pt. Parents at bedside. Assessment & Plan: Hx of depression/anxiety -appreciate psychiatry services 
-adjust the benzo dose  
-added medication to help in sleep 
-psychiatry resumes to follow patient 
-continue 1:1 sitter. Tachycardia, asymtomatic in the setting of anxiety of young patient 
-refer to above and h&p regarding note that \"psych did not feel comfortable admitting him because of his tachycardia. \" 
-refer to above for plan Code status: full DVT prophylaxis: ambulatory and scd's if in bed for prolonged time. Care Plan discussed with:patient/family/nurse Disposition: refer to above, when HR improved with likely better control of anxiety to then consider transfer to psychiatry service. Hospital Problems  Date Reviewed: 10/8/2015 Codes Class Noted POA Overdose ICD-10-CM: T17.761M ICD-9-CM: 977.9, E980.5  12/30/2018 Unknown Review of Systems:  
ROS negative other than noted above and in h&p Vital Signs:  
 Last 24hrs VS reviewed since prior progress note. Most recent are: 
Visit Vitals /71 (BP 1 Location: Right arm) Pulse 87 Temp 97.9 °F (36.6 °C) Resp 23 Ht 5' 4.75\" (1.645 m) Wt 53.3 kg (117 lb 8.1 oz) SpO2 99% BMI 19.71 kg/m² Intake/Output Summary (Last 24 hours) at 12/31/2018 1312 Last data filed at 12/31/2018 7768 Gross per 24 hour Intake  Output 1 ml Net -1 ml Physical Examination:  
 
 
     
Constitutional:  No acute distress, cooperative, pleasant   
ENT:  anicteric Resp:  CTA bilaterally. No wheezing/rhonchi/rales. No accessory muscle use CV:  Regular rhythm, tachy, no rubs or murmurs GI:  Soft, non distended, non tender. normoactive bowel sounds Musculoskeletal:  No edema, warm, 2+ pulses throughout Neurologic:  Moves all extremities. AAOx3, CN II-XII reviewed Data Review:  
reviewed Labs:  
 
Recent Labs 12/30/18 
9313 WBC 11.4* HGB 15.6 HCT 46.6  Recent Labs 12/30/18 
5507 * K 3.7  CO2 25 BUN 12  
CREA 0.92  
* CA 8.8 MG 1.9 Recent Labs 12/30/18 
3162 SGOT 14* ALT 19 AP 98 TBILI 0.7 TP 8.0 ALB 4.2 GLOB 3.8 Recent Labs 12/30/18 
6816  Medications Reviewed:  
 
______________________________________________________ EXPECTED LENGTH OF STAY: - - - 
ACTUAL LENGTH OF STAY:          0 Matthew Cook MD

## 2018-12-31 NOTE — PROGRESS NOTES
Bedside shift change report given to Annia Chaparro (oncoming nurse) by Elham Pina (offgoing nurse). Report included the following information SBAR, Kardex, Procedure Summary, MAR, Accordion, Recent Results and Cardiac Rhythm NSR/ST.

## 2018-12-31 NOTE — PROGRESS NOTES
Problem: Falls - Risk of 
Goal: *Absence of Falls Document Kevin Oneill Fall Risk and appropriate interventions in the flowsheet. Outcome: Progressing Towards Goal 
Fall Risk Interventions: 
  
 
  
 
Medication Interventions: Assess postural VS orthostatic hypotension, Evaluate medications/consider consulting pharmacy, Patient to call before getting OOB, Teach patient to arise slowly

## 2018-12-31 NOTE — PROGRESS NOTES
0141 Report given to Charles Bush RN using SBAR format 
0400 Pt transported to [facility] in wheelchair on monitor without incident

## 2018-12-31 NOTE — PROGRESS NOTES
Problem: Falls - Risk of 
Goal: *Absence of Falls Document Carlie Sherlys Fall Risk and appropriate interventions in the flowsheet. Outcome: Progressing Towards Goal 
Fall Risk Interventions: 
  
 
  
 
Medication Interventions: Patient to call before getting OOB, Teach patient to arise slowly Problem: Pressure Injury - Risk of 
Goal: *Prevention of pressure injury Document Ray Scale and appropriate interventions in the flowsheet. Outcome: Progressing Towards Goal 
Pressure Injury Interventions: 
Sensory Interventions: Assess changes in LOC, Check visual cues for pain, Keep linens dry and wrinkle-free, Maintain/enhance activity level, Minimize linen layers, Monitor skin under medical devices, Pad between skin to skin, Pressure redistribution bed/mattress (bed type), Turn and reposition approx. every two hours (pillows and wedges if needed) Activity Interventions: Pressure redistribution bed/mattress(bed type), PT/OT evaluation Nutrition Interventions: Document food/fluid/supplement intake Friction and Shear Interventions: Lift sheet, Minimize layers

## 2018-12-31 NOTE — PROGRESS NOTES
TRANSFER - IN REPORT: 
 
Verbal report received from Valeriy(name) on Clifm Emi  being received from ICU(unit) for routine progression of care Report consisted of patients Situation, Background, Assessment and  
Recommendations(SBAR). Information from the following report(s) SBAR, Kardex, MAR, Accordion, Recent Results and Cardiac Rhythm NSR/ST was reviewed with the receiving nurse. Opportunity for questions and clarification was provided. Assessment completed upon patients arrival to unit and care assumed.

## 2018-12-31 NOTE — PROGRESS NOTES
Problem: Pressure Injury - Risk of 
Goal: *Prevention of pressure injury Document Ray Scale and appropriate interventions in the flowsheet. Outcome: Progressing Towards Goal 
Pressure Injury Interventions: 
Sensory Interventions: Assess changes in LOC, Avoid rigorous massage over bony prominences, Check visual cues for pain, Float heels, Keep linens dry and wrinkle-free, Monitor skin under medical devices Activity Interventions: PT/OT evaluation, Pressure redistribution bed/mattress(bed type), Increase time out of bed Nutrition Interventions: Document food/fluid/supplement intake Friction and Shear Interventions: HOB 30 degrees or less

## 2019-01-01 LAB
ALBUMIN SERPL-MCNC: 4.2 G/DL (ref 3.5–5)
ALBUMIN/GLOB SERPL: 1.1 {RATIO} (ref 1.1–2.2)
ALP SERPL-CCNC: 97 U/L (ref 45–117)
ALT SERPL-CCNC: 18 U/L (ref 12–78)
ANION GAP SERPL CALC-SCNC: 7 MMOL/L (ref 5–15)
AST SERPL-CCNC: 14 U/L (ref 15–37)
BASOPHILS # BLD: 0.1 K/UL (ref 0–0.1)
BASOPHILS NFR BLD: 1 % (ref 0–1)
BILIRUB DIRECT SERPL-MCNC: 0.2 MG/DL (ref 0–0.2)
BILIRUB SERPL-MCNC: 0.6 MG/DL (ref 0.2–1)
BUN SERPL-MCNC: 12 MG/DL (ref 6–20)
BUN/CREAT SERPL: 12 (ref 12–20)
CALCIUM SERPL-MCNC: 9.4 MG/DL (ref 8.5–10.1)
CHLORIDE SERPL-SCNC: 102 MMOL/L (ref 97–108)
CO2 SERPL-SCNC: 30 MMOL/L (ref 21–32)
CREAT SERPL-MCNC: 1.02 MG/DL (ref 0.7–1.3)
DIFFERENTIAL METHOD BLD: ABNORMAL
EOSINOPHIL # BLD: 0.1 K/UL (ref 0–0.4)
EOSINOPHIL NFR BLD: 1 % (ref 0–7)
ERYTHROCYTE [DISTWIDTH] IN BLOOD BY AUTOMATED COUNT: 12.9 % (ref 11.5–14.5)
GLOBULIN SER CALC-MCNC: 3.7 G/DL (ref 2–4)
GLUCOSE SERPL-MCNC: 91 MG/DL (ref 65–100)
HCT VFR BLD AUTO: 51.6 % (ref 36.6–50.3)
HGB BLD-MCNC: 17.5 G/DL (ref 12.1–17)
IMM GRANULOCYTES # BLD: 0 K/UL (ref 0–0.04)
IMM GRANULOCYTES NFR BLD AUTO: 0 % (ref 0–0.5)
INR PPP: 1.1 (ref 0.9–1.1)
LYMPHOCYTES # BLD: 3.2 K/UL (ref 0.8–3.5)
LYMPHOCYTES NFR BLD: 31 % (ref 12–49)
MCH RBC QN AUTO: 31 PG (ref 26–34)
MCHC RBC AUTO-ENTMCNC: 33.9 G/DL (ref 30–36.5)
MCV RBC AUTO: 91.5 FL (ref 80–99)
MONOCYTES # BLD: 0.9 K/UL (ref 0–1)
MONOCYTES NFR BLD: 9 % (ref 5–13)
NEUTS SEG # BLD: 5.9 K/UL (ref 1.8–8)
NEUTS SEG NFR BLD: 58 % (ref 32–75)
NRBC # BLD: 0 K/UL (ref 0–0.01)
NRBC BLD-RTO: 0 PER 100 WBC
PLATELET # BLD AUTO: 269 K/UL (ref 150–400)
PMV BLD AUTO: 11.1 FL (ref 8.9–12.9)
POTASSIUM SERPL-SCNC: 3.9 MMOL/L (ref 3.5–5.1)
PROT SERPL-MCNC: 7.9 G/DL (ref 6.4–8.2)
PROTHROMBIN TIME: 10.8 SEC (ref 9–11.1)
RBC # BLD AUTO: 5.64 M/UL (ref 4.1–5.7)
SODIUM SERPL-SCNC: 139 MMOL/L (ref 136–145)
WBC # BLD AUTO: 10.2 K/UL (ref 4.1–11.1)

## 2019-01-01 PROCEDURE — 36415 COLL VENOUS BLD VENIPUNCTURE: CPT

## 2019-01-01 PROCEDURE — 74011250637 HC RX REV CODE- 250/637: Performed by: INTERNAL MEDICINE

## 2019-01-01 PROCEDURE — 80076 HEPATIC FUNCTION PANEL: CPT

## 2019-01-01 PROCEDURE — 85025 COMPLETE CBC W/AUTO DIFF WBC: CPT

## 2019-01-01 PROCEDURE — 80048 BASIC METABOLIC PNL TOTAL CA: CPT

## 2019-01-01 PROCEDURE — 99218 HC RM OBSERVATION: CPT

## 2019-01-01 PROCEDURE — 65270000029 HC RM PRIVATE

## 2019-01-01 PROCEDURE — 85610 PROTHROMBIN TIME: CPT

## 2019-01-01 RX ADMIN — ALPRAZOLAM 1 MG: 0.5 TABLET ORAL at 12:23

## 2019-01-01 RX ADMIN — ALPRAZOLAM 1 MG: 0.5 TABLET ORAL at 18:57

## 2019-01-01 RX ADMIN — Medication 10 ML: at 14:23

## 2019-01-01 RX ADMIN — QUETIAPINE FUMARATE 25 MG: 25 TABLET ORAL at 21:10

## 2019-01-01 RX ADMIN — Medication 10 ML: at 21:10

## 2019-01-01 RX ADMIN — Medication 10 ML: at 07:06

## 2019-01-01 NOTE — PROGRESS NOTES
01/01/19 1223 Vitals Temp 97.3 °F (36.3 °C) Temp Source Oral  
Pulse (Heart Rate) (!) 103 Heart Rate Source Monitor Resp Rate 19  
O2 Sat (%) 99 % Level of Consciousness Alert /79 MAP (Calculated) 91 BP 1 Location Right arm BP 1 Method Automatic  
BP Patient Position During activity Cardiac Rhythm Sinus Tach MEWS Score 2 Pain 1 Pain Scale 1 Numeric (0 - 10) Pain Intensity 1 0 Patient Stated Pain Goal 0 Oxygen Therapy O2 Device Room air Patient Observation Repositioned Head of bed elevated (degrees); Heels off loaded Patient Turned Turns self Observations in bed; crying Patient had just fallen asleep so vitals were delayed.

## 2019-01-01 NOTE — PROGRESS NOTES
Problem: Falls - Risk of 
Goal: *Absence of Falls Document Jayme Roque Fall Risk and appropriate interventions in the flowsheet. Outcome: Progressing Towards Goal 
Fall Risk Interventions: 
  
 
  
 
Medication Interventions: Evaluate medications/consider consulting pharmacy, Patient to call before getting OOB, Teach patient to arise slowly, Assess postural VS orthostatic hypotension

## 2019-01-01 NOTE — PROGRESS NOTES
Hospitalist Progress Note Roxanna Love MD 
Call  Date of Service:  2019 NAME:  Reid Richard :  1995 MRN:  [id number] Admission Summary:  
2018 excerpts of h&p note: \"Arun Miller is a 26 y.o. male with a history of anxiety and depression who presented to the ED after he overdosed on Adderrall. Seen with his parents at the bedside. He states that he has been having a lot of stress and anxiety lately. He went to work and then came home and decided to try to end his life by taking too many pills, but then immediately realized that it was a mistake. His parent state it was a \"cry for help. \" This is his second overdose attempt. The last being approx. 3 years ago when he took Lamictal.  
Per his parents he has not been taking his Psych meds as he is supposed to. He is on PRN Xanax for anxiety and refuses to take anything else. He has been seeing a psychiatrist intermittently, but needs to follow more closely with a counselor. His mother states that this may be what he needs to \"get is head straight. \" 
Poison control was contacted by the ED. Activated charcoal was recommended. Psych did not feel comfortable admitting him because of his tachycardia. \" Interval history / Subjective:  
Pt seen and examined at bedside, mother and step mother present, we had lengthy conversation, he has feeling of lack of control that is the main problem for him, he refused treatment options to feed his feeling of conrol. HR controlled, rate <100. Assessment & Plan:  
 
Overdose on medications: suicide attempt Depression/anxiety -appreciate psychiatry services 
-adjust the benzo dose  
-added medication to help in sleep 
-psychiatry resumes to follow patient 
-continue 1:1 sitter.  
 
Tachycardia, asymtomatic in the setting of anxiety of young patient 
-refer to above and h&p regarding note that \"psych did not feel comfortable admitting him because of his tachycardia. \" - his HR is controlled now, rate <100 Code status: full DVT prophylaxis: ambulatory and scd's if in bed for prolonged time. Care Plan discussed with:patient/family/nurse Disposition: refer to above, when HR improved with likely better control of anxiety to then consider transfer to psychiatry service. Hospital Problems  Date Reviewed: 10/8/2015 Codes Class Noted POA * (Principal) Overdose ICD-10-CM: T50.901A ICD-9-CM: 977.9, E980.5  12/30/2018 Yes Review of Systems:  
ROS negative other than noted above and in h&p Vital Signs:  
 Last 24hrs VS reviewed since prior progress note. Most recent are: 
Visit Vitals /79 (BP 1 Location: Right arm, BP Patient Position: During activity) Pulse (!) 103 Temp 97.3 °F (36.3 °C) Resp 19 Ht 5' 4.75\" (1.645 m) Wt 52.8 kg (116 lb 6.5 oz) SpO2 99% BMI 19.52 kg/m² No intake or output data in the 24 hours ending 01/01/19 1234 Physical Examination:  
   
Constitutional:  No acute distress, cooperative, pleasant   
ENT:  anicteric Resp:  CTA bilaterally. No wheezing/rhonchi/rales. No accessory muscle use CV:  Regular rhythm, tachy, GI:  Soft, non distended, non tender. normoactive bowel sounds Musculoskeletal:  No edema, warm, Neurologic:  Moves all extremities. AAOx3, Data Review:  
reviewed Labs:  
 
Recent Labs 01/01/19 
0316 12/30/18 
1406 WBC 10.2 11.4* HGB 17.5* 15.6 HCT 51.6* 46.6  214 Recent Labs 01/01/19 
0316 12/30/18 
5196  135* K 3.9 3.7  102 CO2 30 25 BUN 12 12 CREA 1.02 0.92  
GLU 91 105* CA 9.4 8.8 MG  --  1.9 Recent Labs 01/01/19 
0316 12/30/18 
0249 SGOT 14* 14* ALT 18 19 AP 97 98 TBILI 0.6 0.7 TP 7.9 8.0 ALB 4.2 4.2 GLOB 3.7 3.8 Recent Labs 12/30/18 
2420  Medications Reviewed: ______________________________________________________ EXPECTED LENGTH OF STAY: - - - 
ACTUAL LENGTH OF STAY:          0 Manolo Holloway MD

## 2019-01-01 NOTE — PROGRESS NOTES
Bedside shift change report given to Jennifer Pritchard RN (oncoming nurse) by Shayy Deshpande RN (offgoing nurse). Report included the following information SBAR, Kardex, Intake/Output, MAR, Recent Results and Cardiac Rhythm NSR/ST.

## 2019-01-01 NOTE — PROGRESS NOTES
Problem: Pressure Injury - Risk of 
Goal: *Prevention of pressure injury Document Ray Scale and appropriate interventions in the flowsheet. Outcome: Progressing Towards Goal 
Pressure Injury Interventions: 
Sensory Interventions: Avoid rigorous massage over bony prominences, Assess need for specialty bed, Check visual cues for pain, Discuss PT/OT consult with provider, Float heels, Keep linens dry and wrinkle-free, Monitor skin under medical devices Moisture Interventions: Apply protective barrier, creams and emollients, Absorbent underpads, Maintain skin hydration (lotion/cream), Minimize layers Activity Interventions: Increase time out of bed, Pressure redistribution bed/mattress(bed type), PT/OT evaluation Nutrition Interventions: Document food/fluid/supplement intake Friction and Shear Interventions: Foam dressings/transparent film/skin sealants, HOB 30 degrees or less

## 2019-01-01 NOTE — PROGRESS NOTES
Bedside and Verbal shift change report given to Christo Bray RN (oncoming nurse) by Coleen De La Rosa RN (offgoing nurse). Report included the following information SBAR, Kardex, Intake/Output, MAR, Recent Results and Cardiac Rhythm Sinus tach.

## 2019-01-01 NOTE — PROGRESS NOTES
Problem: Suicide/Homicide (Adult/Pediatric) Goal: *STG: Remains safe in hospital 
Outcome: Progressing Towards Goal 
Pt not currently having suicidal ideations

## 2019-01-02 ENCOUNTER — HOSPITAL ENCOUNTER (INPATIENT)
Age: 24
LOS: 2 days | Discharge: HOME | DRG: 918 | End: 2019-01-04
Attending: PSYCHIATRY & NEUROLOGY | Admitting: PSYCHIATRY & NEUROLOGY
Payer: COMMERCIAL

## 2019-01-02 VITALS
OXYGEN SATURATION: 100 % | WEIGHT: 117.73 LBS | RESPIRATION RATE: 20 BRPM | BODY MASS INDEX: 19.61 KG/M2 | SYSTOLIC BLOOD PRESSURE: 126 MMHG | HEART RATE: 92 BPM | TEMPERATURE: 97.9 F | DIASTOLIC BLOOD PRESSURE: 71 MMHG | HEIGHT: 65 IN

## 2019-01-02 PROBLEM — T14.91XA SUICIDE ATTEMPT (HCC): Status: ACTIVE | Noted: 2019-01-02

## 2019-01-02 PROCEDURE — 99218 HC RM OBSERVATION: CPT

## 2019-01-02 PROCEDURE — 74011250637 HC RX REV CODE- 250/637: Performed by: INTERNAL MEDICINE

## 2019-01-02 PROCEDURE — 65660000000 HC RM CCU STEPDOWN

## 2019-01-02 PROCEDURE — 65220000003 HC RM SEMIPRIVATE PSYCH

## 2019-01-02 PROCEDURE — 74011250637 HC RX REV CODE- 250/637: Performed by: PSYCHIATRY & NEUROLOGY

## 2019-01-02 RX ORDER — OLANZAPINE 5 MG/1
5 TABLET ORAL
Status: DISCONTINUED | OUTPATIENT
Start: 2019-01-02 | End: 2019-01-04 | Stop reason: HOSPADM

## 2019-01-02 RX ORDER — LORAZEPAM 2 MG/ML
2 INJECTION INTRAMUSCULAR
Status: DISCONTINUED | OUTPATIENT
Start: 2019-01-02 | End: 2019-01-04 | Stop reason: HOSPADM

## 2019-01-02 RX ORDER — IBUPROFEN 200 MG
1 TABLET ORAL
Status: DISCONTINUED | OUTPATIENT
Start: 2019-01-02 | End: 2019-01-04 | Stop reason: HOSPADM

## 2019-01-02 RX ORDER — ACETAMINOPHEN 325 MG/1
650 TABLET ORAL
Status: DISCONTINUED | OUTPATIENT
Start: 2019-01-02 | End: 2019-01-04 | Stop reason: HOSPADM

## 2019-01-02 RX ORDER — BENZTROPINE MESYLATE 2 MG/1
2 TABLET ORAL
Status: DISCONTINUED | OUTPATIENT
Start: 2019-01-02 | End: 2019-01-04 | Stop reason: HOSPADM

## 2019-01-02 RX ORDER — ADHESIVE BANDAGE
30 BANDAGE TOPICAL DAILY PRN
Status: DISCONTINUED | OUTPATIENT
Start: 2019-01-02 | End: 2019-01-04 | Stop reason: HOSPADM

## 2019-01-02 RX ORDER — BENZTROPINE MESYLATE 1 MG/ML
2 INJECTION INTRAMUSCULAR; INTRAVENOUS
Status: DISCONTINUED | OUTPATIENT
Start: 2019-01-02 | End: 2019-01-04 | Stop reason: HOSPADM

## 2019-01-02 RX ORDER — ZOLPIDEM TARTRATE 10 MG/1
10 TABLET ORAL
Status: DISCONTINUED | OUTPATIENT
Start: 2019-01-02 | End: 2019-01-03

## 2019-01-02 RX ORDER — LORAZEPAM 1 MG/1
1 TABLET ORAL
Status: DISCONTINUED | OUTPATIENT
Start: 2019-01-02 | End: 2019-01-03

## 2019-01-02 RX ORDER — IBUPROFEN 400 MG/1
400 TABLET ORAL
Status: DISCONTINUED | OUTPATIENT
Start: 2019-01-02 | End: 2019-01-04 | Stop reason: HOSPADM

## 2019-01-02 RX ADMIN — LORAZEPAM 1 MG: 1 TABLET ORAL at 22:08

## 2019-01-02 RX ADMIN — ALPRAZOLAM 1 MG: 0.5 TABLET ORAL at 07:01

## 2019-01-02 RX ADMIN — Medication 10 ML: at 07:01

## 2019-01-02 NOTE — PHYSICIAN ADVISORY
Letter of Status Determination:  
Recommend hospitalization status upgraded from OBSERVATION  to INPATIENT  Status Pt Name:  Krystyna Young MR#  
MANE # [id number] / 
[id number] Payor: Bhakti Mallory / Plan: Valentin Crowley / Product Type: HMO /   
COLLINS#  [id number] Room [facility] Hospital  674/01  @ [facility]  
Hospitalization date  12/30/2018  4:40 AM  
Current Attending Physician  Elvira Hernandez MD  
Principal diagnosis  Overdose Clinicals Krystyna Young is a 26 y.o. male with a history of anxiety and depression who presented to the ED after he overdosed on Adderrall. Seen with his parents at the bedside. 
  
He stated that he has been having a lot of stress and anxiety lately. He went to work and then came home and decided to try to end his life by taking too many pills, but then immediately realized that it was a mistake. His parent state it was a \"cry for help. \" This is his second overdose attempt. The last being approx. 3 years ago when he took Lamictal.  
  
Per his parents he had not been taking his Psych meds as he is supposed to. He is on PRN Xanax for anxiety and refuses to take anything else. He has been seeing a psychiatrist intermittently, but needs to follow more closely with a counselor. His mother states that this may be what he needs to \"get is head straight. \" 
Pt admitted, needed medication titration, including benzodiazepine does optimization, Psych eval and continued to be persistently tachycardic.    
  
 
  
[place] ([facility]) criteria Does  NOT apply STATUS DETERMINATION  INPATIENT The final decision of the patient's hospitalization status depends on the attending physician's judgment Additional comments Payor: Bhakti Mallory / Plan: Valentin Crowley / Product Type: O /   
  
 
Yoko Bedolla MD 
Cell: [phone] Physician Advisor

## 2019-01-02 NOTE — PROGRESS NOTES
Problem: Falls - Risk of 
Goal: *Absence of Falls Document Ernestina Pak Fall Risk and appropriate interventions in the flowsheet. Outcome: Progressing Towards Goal 
Fall Risk Interventions: 
  
 
  
 
Medication Interventions: Evaluate medications/consider consulting pharmacy, Patient to call before getting OOB Comments: Pt steady on his feet, sitter at bedside

## 2019-01-02 NOTE — PROGRESS NOTES
Problem: Falls - Risk of 
Goal: *Absence of Falls Document Judie Starch Fall Risk and appropriate interventions in the flowsheet. Outcome: Progressing Towards Goal 
Fall Risk Interventions: 
  
 
  
 
Medication Interventions: Assess postural VS orthostatic hypotension, Evaluate medications/consider consulting pharmacy, Teach patient to arise slowly Problem: Pressure Injury - Risk of 
Goal: *Prevention of pressure injury Document Ray Scale and appropriate interventions in the flowsheet. Outcome: Progressing Towards Goal 
Pressure Injury Interventions: 
Sensory Interventions: Assess changes in LOC, Assess need for specialty bed Moisture Interventions: Apply protective barrier, creams and emollients, Absorbent underpads, Maintain skin hydration (lotion/cream), Minimize layers Activity Interventions: Increase time out of bed, Pressure redistribution bed/mattress(bed type), PT/OT evaluation Nutrition Interventions: Document food/fluid/supplement intake Friction and Shear Interventions: Foam dressings/transparent film/skin sealants, HOB 30 degrees or less

## 2019-01-02 NOTE — PROGRESS NOTES
Patient admitted for suicide attempt with drug overdose. Visit Vitals /71 (BP 1 Location: Right arm, BP Patient Position: At rest) Pulse 92 Temp 97.9 °F (36.6 °C) Resp 20 Ht 5' 4.75\" (1.645 m) Wt 53.4 kg (117 lb 11.6 oz) SpO2 100% BMI 19.74 kg/m² Patient is medically stable to be discharged to psych floor

## 2019-01-02 NOTE — BH NOTES
TRANSFER - IN REPORT: 
 
Verbal report received from [facility](name) on Jerilyn Spurling  being received from [facility](unit) for routine progression of care Report consisted of patients Situation, Background, Assessment and  
Recommendations(SBAR). Information from the following report(s) SBAR was reviewed with the receiving nurse. Opportunity for questions and clarification was provided. Assessment completed upon patients arrival to unit and care assumed.

## 2019-01-02 NOTE — PROGRESS NOTES
Patient discharged to behavioral health unit-- [address]. Discharge instructions reviewed with the patient. IV and telemetry were discontinued. Mother was called and updated on the discharge and admit to [address] [place] [address]. Patient has no further questions and concerns and is okay with the plan to be monitored in the behavioral health unit.  Report was given to Yosvany Ramires

## 2019-01-02 NOTE — BH NOTES
Patient admitted voluntarily to [address] [place] [address] Psychiatry, under the services of Meek Rebolledo. Patient currently denies suicidal ideation. Patient currently denies homicidal ideation. Patient verbally contracts for safety. Patient denies psychotic symptoms. Pt reorts ETOH, occasional use. Pt admits to drug use, THC, occasional use.

## 2019-01-02 NOTE — PROGRESS NOTES
Pt took overdose of Adderall with intent to die secondary to depressive and hopeless feelings. Thought the better of action especially effect on family and told other immediately and came to ED. At this point medically stable. He states he has been depressed and anxious for years but more so these past few months following girlfriend breaking up with him and escalating exasperation of parents with his roller coaster emotional and seemingly purposeless life. States he feels the need to stabilize and be able to manage emotions without being overwhelmed. Currently feels that is not the case and he is not feeling safe being discharged. He does conract for safety in the hospital. 
Dx: Borderline personality disorder Depressive episode with suicidal sx secondary to borderline personality regression. Pt does not need 1:1 supervision as he is not acute risk for acting in suicidal manner. He is agreeing to transfer to psychiatry. Bereket Mcr note to follow

## 2019-01-02 NOTE — PROGRESS NOTES
Bedside and Verbal shift change report given to Aden He RN (oncoming nurse) by Sara Desai RN (offgoing nurse). Report included the following information SBAR, MAR, Kardex, Recent results, Sinus tach.

## 2019-01-02 NOTE — DISCHARGE SUMMARY
Discharge Summary     Patient:  Kun Lenz       MRN: [id number]       YOB: 1995       Age: 26 y.o. Date of admission:  12/30/2018    Date of discharge:  1/2/2019    Primary care provider: Dr. Álvaro Valdovinos MD    Admitting provider:  Karen Alegria MD    Discharging provider:  Eliza Prasad 91.: [phone]. If unavailable, call [phone] and ask the  to page the triage hospitalist.    Consultations  · IP CONSULT TO HOSPITALIST  · IP CONSULT TO PSYCHIATRY  · IP CONSULT TO PSYCHIATRY    Procedures  · * No surgery found *    Discharge destination: psychiatry. The patient is stable for discharge. Admission diagnosis  · Overdose  · Overdose    Current Discharge Medication List      CONTINUE these medications which have NOT CHANGED    Details   ALPRAZolam (XANAX) 0.5 mg tablet Take 0.5 mg by mouth three (3) times daily as needed for Anxiety. QUEtiapine (SEROQUEL) 25 mg tablet Take 25 mg by mouth. STOP taking these medications       dextroamphetamine-amphetamine (ADDERALL) 5 mg tablet Comments:   Reason for Stopping:         vortioxetine (TRINTELLIX) 10 mg tablet Comments:   Reason for Stopping: Follow-up Information     Follow up With Specialties Details Why Contact Info    Álvaro Valdovinos MD Pediatrics   [address]  [phone]            Final discharge diagnoses and brief hospital course  12/30/2018 excerpts of h&p note:  \"Arun Blanton is a 23 y. o. male with a history of anxiety and depression who presented to the ED after he overdosed on Adderrall. Seen with his parents at the bedside. He states that he has been having a lot of stress and anxiety lately.  He went to work and then came home and decided to try to end his life by taking too many pills, but then immediately realized that it was a mistake. His parent state it was a \"cry for help. \" This is his second overdose attempt. The last being approx. 3 years ago when he took Lamictal.   Per his parents he has not been taking his Psych meds as he is supposed to. He is on PRN Xanax for anxiety and refuses to take anything else. He has been seeing a psychiatrist intermittently, but needs to follow more closely with a counselor. His mother states that this may be what he needs to \"get is head straight. \"  Poison control was contacted by the ED. Activated charcoal was recommended. Psych did not feel comfortable admitting him because of his tachycardia. \"    Overdose on medications: suicide attempt  Depression/anxiety  -appreciate psychiatry services  -adjust the benzo dose   -added medication to help in sleep  -psychiatry resumes to follow patient  -continue 1:1 sitter.     Tachycardia, asymtomatic in the setting of anxiety of young patient  -refer to above and h&p regarding note that \"psych did not feel comfortable admitting him because of his tachycardia. \" - his HR is controlled now, rate <100    Discussed with psychiatry Dr. Nicloe Mesa, ok to dc to psychiatry for further treatment     Physical examination at discharge  Visit Vitals  /71 (BP 1 Location: Right arm, BP Patient Position: At rest)   Pulse 92   Temp 97.9 °F (36.6 °C)   Resp 20   Ht 5' 4.75\" (1.645 m)   Wt 53.4 kg (117 lb 11.6 oz)   SpO2 100%   BMI 19.74 kg/m²     Constitutional:  No acute distress, cooperative, pleasant    ENT:  anicteric   Resp:  CTA bilaterally. No wheezing/rhonchi/rales. No accessory muscle use   CV:  Regular rhythm, tachy,     GI:  Soft, non distended, non tender. normoactive bowel sounds      Musculoskeletal:  No edema, warm,    Neurologic:  Moves all extremities.   AAOx3,            Pertinent imaging studies:    none    Recent Labs     01/01/19 0316   WBC 10.2   HGB 17.5*   HCT 51.6*        Recent Labs     01/01/19 0316      K 3.9      CO2 30   BUN 12 CREA 1.02   GLU 91   CA 9.4     Recent Labs     01/01/19  0316   SGOT 14*   AP 97   TP 7.9   ALB 4.2   GLOB 3.7     Recent Labs     01/01/19  0316   INR 1.1   PTP 10.8      No results for input(s): FE, TIBC, PSAT, FERR in the last 72 hours. No results for input(s): PH, PCO2, PO2 in the last 72 hours. No results for input(s): CPK, CKMB in the last 72 hours. No lab exists for component: TROPONINI  No components found for: [place]    Chronic Diagnoses:    Problem List as of 1/2/2019 Date Reviewed: 10/8/2015          Codes Class Noted - Resolved    * (Principal) Suicide attempt [facility]) ICD-10-CM: T14.91XA  ICD-9-CM: E958.9  1/2/2019 - Present        Overdose ICD-10-CM: T50.901A  ICD-9-CM: 977.9, E980.5  12/30/2018 - Present        Rhabdomyolysis ICD-10-CM: M62.82  ICD-9-CM: 728.88  10/8/2015 - Present        Toxic noninfectious hepatitis ICD-10-CM: K71.6  ICD-9-CM: 573.3  10/8/2015 - Present        Drug overdose, intentional ([facility] [address] ICD-10-CM: Sean Santosh  ICD-9-CM: 977.9, E980.5  10/3/2015 - Present        Tachycardia with 121 - 140 beats per minute ICD-10-CM: R00.0  ICD-9-CM: 785.0  10/3/2015 - Present        Leukocytosis ICD-10-CM: D72.829  ICD-9-CM: 288.60  10/3/2015 - Present        Myoclonus ICD-10-CM: G25.3  ICD-9-CM: 333.2  10/3/2015 - Present        Depression (Chronic) ICD-10-CM: F32.9  ICD-9-CM: 298  10/3/2015 - Present        Anxiety ICD-10-CM: F41.9  ICD-9-CM: 300.00  10/3/2015 - Present              Time spent on discharge related activities today greater than 30 minutes.       Signed:  Marilyn Sheets MD                 Hospitalist, Internal Medicine      Cc: Maricruz Ingram MD

## 2019-01-02 NOTE — PROGRESS NOTES
Bedside shift change report given to RODOLFO Hope (oncoming nurse) by Tre Major RN (offgoing nurse). Report included the following information SBAR, Kardex, Intake/Output, MAR, Recent Results and Cardiac Rhythm NSR/ST.

## 2019-01-03 PROBLEM — F33.9 MAJOR DEPRESSION, RECURRENT (HCC): Status: ACTIVE | Noted: 2019-01-03

## 2019-01-03 PROBLEM — F60.3 BORDERLINE PERSONALITY DISORDER (HCC): Status: ACTIVE | Noted: 2019-01-03

## 2019-01-03 LAB
CHOLEST SERPL-MCNC: 116 MG/DL
GLUCOSE P FAST SERPL-MCNC: 66 MG/DL (ref 65–100)
HDLC SERPL-MCNC: 39 MG/DL
HDLC SERPL: 3 {RATIO} (ref 0–5)
LDLC SERPL CALC-MCNC: 57.8 MG/DL (ref 0–100)
LIPID PROFILE,FLP: NORMAL
TRIGL SERPL-MCNC: 96 MG/DL (ref ?–150)
TSH SERPL DL<=0.05 MIU/L-ACNC: 1.19 UIU/ML (ref 0.36–3.74)
VLDLC SERPL CALC-MCNC: 19.2 MG/DL

## 2019-01-03 PROCEDURE — 84443 ASSAY THYROID STIM HORMONE: CPT

## 2019-01-03 PROCEDURE — 36415 COLL VENOUS BLD VENIPUNCTURE: CPT

## 2019-01-03 PROCEDURE — 80061 LIPID PANEL: CPT

## 2019-01-03 PROCEDURE — 65220000003 HC RM SEMIPRIVATE PSYCH

## 2019-01-03 PROCEDURE — 74011250637 HC RX REV CODE- 250/637: Performed by: PSYCHIATRY & NEUROLOGY

## 2019-01-03 PROCEDURE — 82947 ASSAY GLUCOSE BLOOD QUANT: CPT

## 2019-01-03 RX ORDER — LANOLIN ALCOHOL/MO/W.PET/CERES
3 CREAM (GRAM) TOPICAL
Status: DISCONTINUED | OUTPATIENT
Start: 2019-01-03 | End: 2019-01-04 | Stop reason: HOSPADM

## 2019-01-03 RX ORDER — HYDROXYZINE 25 MG/1
25 TABLET, FILM COATED ORAL
Status: DISCONTINUED | OUTPATIENT
Start: 2019-01-03 | End: 2019-01-04 | Stop reason: HOSPADM

## 2019-01-03 RX ORDER — DEXTROAMPHETAMINE SACCHARATE, AMPHETAMINE ASPARTATE, DEXTROAMPHETAMINE SULFATE AND AMPHETAMINE SULFATE 1.25; 1.25; 1.25; 1.25 MG/1; MG/1; MG/1; MG/1
7.5-1 TABLET ORAL
COMMUNITY
End: 2019-01-04

## 2019-01-03 RX ORDER — VENLAFAXINE HYDROCHLORIDE 37.5 MG/1
37.5 CAPSULE, EXTENDED RELEASE ORAL
Status: DISCONTINUED | OUTPATIENT
Start: 2019-01-03 | End: 2019-01-04 | Stop reason: HOSPADM

## 2019-01-03 RX ADMIN — VENLAFAXINE HYDROCHLORIDE 37.5 MG: 37.5 CAPSULE, EXTENDED RELEASE ORAL at 12:32

## 2019-01-03 RX ADMIN — Medication 3 MG: at 21:11

## 2019-01-03 RX ADMIN — LORAZEPAM 1 MG: 1 TABLET ORAL at 16:04

## 2019-01-03 NOTE — BH NOTES
GROUP THERAPY PROGRESS NOTE Goss German is participating in reflection group. Group time: 25 minutes Personal goal for participation: Daily progress Goal orientation: personal 
 
Group therapy participation: active Therapeutic interventions reviewed and discussed: Unit rules and regulations. 15 ways to be happy. Impression of participation: active

## 2019-01-03 NOTE — PROGRESS NOTES
Problem: Falls - Risk of 
Goal: *Absence of Falls Document Neri Rincon Fall Risk and appropriate interventions in the flowsheet. Outcome: Progressing Towards Goal 
Fall Risk Interventions: 
Medication Interventions: Teach patient to arise slowly Received pt walking to room to got to bed. No pain/distress noted. Respirations even/unlabored. Will cont to monitor q15 min for safety. Pt became lightheaded during lab draw. Fluids given , cold towel on neck/forehead. Returned to bed after sitting with staff.

## 2019-01-03 NOTE — BH NOTES
PRN Medication Documentation Specific patient behavior that led to need for PRN medication: c/o anxietyStaff interventions attempted prior to PRN being given:coping skills PRN medication given:ativan Patient response/effectiveness of PRN medication:good

## 2019-01-03 NOTE — BH NOTES
GROUP THERAPY PROGRESS NOTE Garfield Franklin participated in a Process Group on the General Unit with a focus identifying feelings,  
planning for the day, and learning about using the [address] as a long-term personal treatment plan. . 
 Group time: 75 minutes. Personal goal for participation: To increase the capacity to improve ones mood, set personal goals, and understand more about basic activities to successfully state and get ones needs met through personal treatment goal setting. Goal orientation: The patients will be able to identify their feelings and develop a goal for themselves for  
their day. The didactic portion of the session covered developing a personal short-term and long-term  
treatment plan for oneself. The group members were asked to consider filling in on their own after group. Below are the elements of a DBT house drawing with multiple levels:   
1) foundation - values that govern your life;  
2) first floor with a door  behaviors would like to manage and feel more control over or areas you want to change; the door represents an opportunity to list or draw things that you keep hidden from others;  
3) second floor  list or draw emotions you want to experience more often, more fully, or in a more healthy fashion;  
4) third floor  a list of things that make you happy or want to feel happy about;  
5) attic  list or draw what  a [employer] [place] would look like. There is also a roof, where people and things that protect you can be listed. The chimney provides an opportunity to list ways in which you blow off steam. The billboard allows one to post those things in one's life they are proud of and the walls of the house provide an opportunity to list those people and things that provide support. Group therapy participation: With prompting, this patient actively participated in the group. Therapeutic interventions reviewed and discussed:  The group members were asked to  
identify an emotion they are having and/or let the group know what they want to focus on for the  
day as they continue to make discharge plans. The group were informed of the elements of the  
[address] for them to complete in their free time. 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 \" The patient was alert, generally oriented, and engaged. He admitted taking an \"overdose of pills\" and that he had a breakup with his girlfriend in November of last year. He denied any current SI/HI and displayed no overt psychotic symptoms in this group. He remained in group long enough to be introduced to the handout, before being called out to meet with his treatment team. His affect was anxious and depressed. His mood reflected his affect. This was the patient's first process group with the undersigned.

## 2019-01-03 NOTE — H&P
INITIAL PSYCHIATRIC EVALUATION   
   
 
IDENTIFICATION:   
Patient Name  Krystyna Young Date of Birth 1995 [facility] [id number] Medical Record Number  [id number] Age  26 y.o. PCP Flores Terry MD  
Admit date:  1/2/2019 Room Number  015/37  [facility]  
Date of Service  1/3/2019 HISTORY  
 
   
REASON FOR HOSPITALIZATION: 
CC:  Pt admitted under a voluntary basis for severe depression with suicidal attempt by overdosing on adderall pills. Pt trasfered from medical floor after being there for 2 days. HISTORY OF PRESENT ILLNESS:   
The patient, Krystyna Young, is a 26 y.o. WHITE [place]  male with a past psychiatric history significant for history of anxiety and depression who presented to the ED after he overdosed on Adderall. Pt reports he was not feeling good after breakup with his girlfriend and decided to  end his life by taking too many pills, but then immediately realized that it was a mistake and told his mother. Pt reports he feels emptiness inspite fo many things going good in his life. Pt reports h/o impulisvity in the past too. Pt reports he feels he is not good enough as compare to his siblings. He states he has been depressed and anxious for years but more so these past few months. Pt reports difficulty managing his emotions. Pt became tearful talking about loosing his Counsellor of 3 years who killed himself and his parent's divorce in middle school which was difficult for him. Krystyna Young  currently denied suicidal/homicidal ideations and plans. Pt denied auditory and visual hallucinations, Pt reports recently being started on trintellex in December. Pt deneid OCD, manic Ptsd symptoms Reports using marijuna daily and vaping Past Psychiatric history: h/o depression, adhd, past meds vyvanse, ristalin, valium, xanax Hospitalizations: no 
Suicidal attempts: yes 3 years ago overdose was admitted to medical 
Substance abuse: THC, vape Family History of mental illness:none Social History: 
Patient is born and raised in [address] [place] [address] [address]. No abuse. .. Patient is single. Pt is employed, support her/himself Legal issues: none ALLERGIES: No Known Allergies MEDICATIONS PRIOR TO ADMISSION:  
Medications Prior to Admission Medication Sig  ALPRAZolam (XANAX) 0.5 mg tablet Take 0.5 mg by mouth three (3) times daily as needed for Anxiety.  QUEtiapine (SEROQUEL) 25 mg tablet Take 25 mg by mouth. PAST MEDICAL HISTORY:  
Past Medical History:  
Diagnosis Date  Anxiety  Depression  Sleep disorder  Substance abuse ([facility] [address]  Suicidal thoughts Past Surgical History:  
Procedure Laterality Date  HX TONSIL AND ADENOIDECTOMY  INTUBATE PATIENT  10/6/2015 SOCIAL HISTORY:   
Social History Socioeconomic History  Marital status: SINGLE Spouse name: Not on file  Number of children: Not on file  Years of education: Not on file  Highest education level: Not on file Social Needs  Financial resource strain: Not on file  Food insecurity - worry: Not on file  Food insecurity - inability: Not on file  Transportation needs - medical: Not on file  Transportation needs - non-medical: Not on file Occupational History  Not on file Tobacco Use  Smoking status: Never Smoker  Smokeless tobacco: Never Used Substance and Sexual Activity  Alcohol use: Yes Comment: social  
 Drug use: Yes Types: Marijuana  Sexual activity: Not on file Other Topics Concern [address] Service Not Asked  Blood Transfusions Not Asked  Caffeine Concern Not Asked  Occupational Exposure Not Asked [employer] Hazards Not Asked  Sleep Concern Not Asked  Stress Concern Not Asked  Weight Concern Not Asked  Special Diet Not Asked  Back Care Not Asked  Exercise Not Asked  Bike Helmet Not Asked  Seat Belt Not Asked  Self-Exams Not Asked Social History Narrative  Not on file FAMILY HISTORY: History reviewed. No pertinent family history. History reviewed. No pertinent family history. REVIEW OF SYSTEMS:  
Negative except Pertinent items are noted in the History of Present Illness. All other Systems reviewed and are considered negative. [facility] MENTAL STATUS EXAM (MSE): MSE FINDINGS ARE WITHIN NORMAL LIMITS (WNL) UNLESS OTHERWISE STATED BELOW. ( ALL OF THE BELOW CATEGORIES OF THE MSE HAVE BEEN REVIEWED (reviewed 1/3/2019) AND UPDATED AS DEEMED APPROPRIATE ) General Presentation age appropriate and casually dressed, cooperative Orientation oriented to time, place and person Vital Signs  See below (reviewed 1/3/2019); Vital Signs (BP, Pulse, & Temp) are within normal limits if not listed below. Gait and Station Stable/steady, no ataxia Musculoskeletal System No extrapyramidal symptoms (EPS); no abnormal muscular movements or Tardive Dyskinesia (TD); muscle strength and tone are within normal limits Language No aphasia or dysarthria Speech:  normal pitch and normal volume Thought Processes logical; normal rate of thoughts; good abstract reasoning/computation Thought Associations goal directed Thought Content free of delusions Suicidal Ideations no plan  and no intention Homicidal Ideations no plan  and no intention Mood:  depressed and labile Affect:  mood-congruent Memory recent  good Memory remote:  good Concentration/Attention:  distractable Fund of Knowledge average Insight:  fair Reliability good Judgment:  fair VITALS:    
Patient Vitals for the past 24 hrs: 
 Temp Pulse Resp BP SpO2  
01/03/19 0823 98.5 °F (36.9 °C) 84 16 110/76 99 % 01/02/19 1830 97.6 °F (36.4 °C) 82 18 114/72 100 % Wt Readings from Last 3 Encounters:  
01/02/19 53.4 kg (117 lb 11.6 oz) 10/06/15 63.7 kg (140 lb 6.4 oz) (33 %, Z= -0.45)* * Growth percentiles are based on [facility] (Boys, 2-20 Years) data. Temp Readings from Last 3 Encounters:  
01/03/19 98.5 °F (36.9 °C)  
01/02/19 97.9 °F (36.6 °C)  
10/12/15 98.2 °F (36.8 °C) BP Readings from Last 3 Encounters:  
01/03/19 110/76  
01/02/19 126/71  
10/12/15 103/63 (10 %, Z = -1.29 /  31 %, Z = -0.49)*  
 
*BP percentiles are based on the August 2017 AAP Clinical Practice Guideline for boys Pulse Readings from Last 3 Encounters:  
01/03/19 84  
01/02/19 92  
10/12/15 93 DATA LABORATORY DATA: 
Labs Reviewed LIPID PANEL  
GLUCOSE, FASTING  
TSH 3RD GENERATION Admission on 01/02/2019 Component Date Value Ref Range Status  LIPID PROFILE 01/03/2019        Final  
 Cholesterol, total 01/03/2019 116  <200 MG/DL Final  
 Triglyceride 01/03/2019 96  <150 MG/DL Final  
 HDL Cholesterol 01/03/2019 39  MG/DL Final  
 LDL, calculated 01/03/2019 57.8  0 - 100 MG/DL Final  
 VLDL, calculated 01/03/2019 19.2  MG/DL Final  
 CHOL/HDL Ratio 01/03/2019 3.0  0 - 5.0   Final  
 Glucose 01/03/2019 66  65 - 100 MG/DL Final  
 TSH 01/03/2019 1.19  0.36 - 3.74 uIU/mL Final  
Admission on 12/30/2018, Discharged on 01/02/2019 Component Date Value Ref Range Status  SAMPLES BEING HELD 12/30/2018 1BLUE   Final  
 COMMENT 12/30/2018 Add-on orders for these samples will be processed based on acceptable specimen integrity and analyte stability, which may vary by analyte. Final  
 Ventricular Rate 12/30/2018 103  BPM Final  
 Atrial Rate 12/30/2018 103  BPM Final  
 P-R Interval 12/30/2018 122  ms Final  
 QRS Duration 12/30/2018 82  ms Final  
 Q-T Interval 12/30/2018 296  ms Final  
 QTC Calculation (Bezet) 12/30/2018 387  ms Final  
 Calculated P Axis 12/30/2018 69  degrees Final  
 Calculated R Axis 12/30/2018 71  degrees Final  
 Calculated T Axis 12/30/2018 54  degrees Final  
 Diagnosis 12/30/2018    Final  
                 Value:Sinus tachycardia No previous ECGs available Confirmed by Jayy Antoine M.D., [facility] ([id number]) on 12/30/2018 9:48:46 AM 
  
 WBC 12/30/2018 11.4* 4.1 - 11.1 K/uL Final  
 RBC 12/30/2018 5.18  4.10 - 5.70 M/uL Final  
 HGB 12/30/2018 15.6  12.1 - 17.0 g/dL Final  
 HCT 12/30/2018 46.6  36.6 - 50.3 % Final  
 MCV 12/30/2018 90.0  80.0 - 99.0 FL Final  
 MCH 12/30/2018 30.1  26.0 - 34.0 PG Final  
 MCHC 12/30/2018 33.5  30.0 - 36.5 g/dL Final  
 RDW 12/30/2018 12.9  11.5 - 14.5 % Final  
 PLATELET 10/86/2872 602  150 - 400 K/uL Final  
 MPV 12/30/2018 10.8  8.9 - 12.9 FL Final  
 NRBC 12/30/2018 0.0  0  WBC Final  
 ABSOLUTE NRBC 12/30/2018 0.00  0.00 - 0.01 K/uL Final  
 NEUTROPHILS 12/30/2018 78* 32 - 75 % Final  
 LYMPHOCYTES 12/30/2018 11* 12 - 49 % Final  
 MONOCYTES 12/30/2018 11  5 - 13 % Final  
 EOSINOPHILS 12/30/2018 0  0 - 7 % Final  
 BASOPHILS 12/30/2018 0  0 - 1 % Final  
 IMMATURE GRANULOCYTES 12/30/2018 0  0.0 - 0.5 % Final  
 ABS. NEUTROPHILS 12/30/2018 8.8* 1.8 - 8.0 K/UL Final  
 ABS. LYMPHOCYTES 12/30/2018 1.2  0.8 - 3.5 K/UL Final  
 ABS. MONOCYTES 12/30/2018 1.2* 0.0 - 1.0 K/UL Final  
 ABS. EOSINOPHILS 12/30/2018 0.0  0.0 - 0.4 K/UL Final  
 ABS. BASOPHILS 12/30/2018 0.0  0.0 - 0.1 K/UL Final  
 ABS. IMM. GRANS.  12/30/2018 0.0  0.00 - 0.04 K/UL Final  
 DF 12/30/2018 AUTOMATED    Final  
 Sodium 12/30/2018 135* 136 - 145 mmol/L Final  
 Potassium 12/30/2018 3.7  3.5 - 5.1 mmol/L Final  
 Chloride 12/30/2018 102  97 - 108 mmol/L Final  
 CO2 12/30/2018 25  21 - 32 mmol/L Final  
 Anion gap 12/30/2018 8  5 - 15 mmol/L Final  
 Glucose 12/30/2018 105* 65 - 100 mg/dL Final  
 BUN 12/30/2018 12  6 - 20 MG/DL Final  
 Creatinine 12/30/2018 0.92  0.70 - 1.30 MG/DL Final  
 BUN/Creatinine ratio 12/30/2018 13  12 - 20   Final  
 GFR est AA 12/30/2018 >60  >60 ml/min/1.73m2 Final  
 GFR est non-AA 12/30/2018 >60  >60 ml/min/1.73m2 Final  
  Calcium 12/30/2018 8.8  8.5 - 10.1 MG/DL Final  
 Bilirubin, total 12/30/2018 0.7  0.2 - 1.0 MG/DL Final  
 ALT (SGPT) 12/30/2018 19  12 - 78 U/L Final  
 AST (SGOT) 12/30/2018 14* 15 - 37 U/L Final  
 Alk.  phosphatase 12/30/2018 98  45 - 117 U/L Final  
 Protein, total 12/30/2018 8.0  6.4 - 8.2 g/dL Final  
 Albumin 12/30/2018 4.2  3.5 - 5.0 g/dL Final  
 Globulin 12/30/2018 3.8  2.0 - 4.0 g/dL Final  
 A-G Ratio 12/30/2018 1.1  1.1 - 2.2   Final  
 AMPHETAMINES 12/30/2018 POSITIVE* NEG   Final  
 BARBITURATES 12/30/2018 NEGATIVE   NEG   Final  
 BENZODIAZEPINES 12/30/2018 POSITIVE* NEG   Final  
 COCAINE 12/30/2018 NEGATIVE   NEG   Final  
 METHADONE 12/30/2018 NEGATIVE   NEG   Final  
 OPIATES 12/30/2018 NEGATIVE   NEG   Final  
 PCP(PHENCYCLIDINE) 12/30/2018 NEGATIVE   NEG   Final  
 THC (TH-CANNABINOL) 12/30/2018 POSITIVE* NEG   Final  
 Drug screen comment 12/30/2018 (NOTE)   Final  
 Magnesium 12/30/2018 1.9  1.6 - 2.4 mg/dL Final  
 ALCOHOL(ETHYL),SERUM 12/30/2018 <10  <10 MG/DL Final  
 Acetaminophen level 12/30/2018 <2* 10 - 30 ug/mL Final  
 Salicylate level 14/56/1659 <1.7* 2.8 - 20.0 MG/DL Final  
 CK 12/30/2018 226  39 - 308 U/L Final  
 Sodium 01/01/2019 139  136 - 145 mmol/L Final  
 Potassium 01/01/2019 3.9  3.5 - 5.1 mmol/L Final  
 Chloride 01/01/2019 102  97 - 108 mmol/L Final  
 CO2 01/01/2019 30  21 - 32 mmol/L Final  
 Anion gap 01/01/2019 7  5 - 15 mmol/L Final  
 Glucose 01/01/2019 91  65 - 100 mg/dL Final  
 BUN 01/01/2019 12  6 - 20 MG/DL Final  
 Creatinine 01/01/2019 1.02  0.70 - 1.30 MG/DL Final  
 BUN/Creatinine ratio 01/01/2019 12  12 - 20   Final  
 GFR est AA 01/01/2019 >60  >60 ml/min/1.73m2 Final  
 GFR est non-AA 01/01/2019 >60  >60 ml/min/1.73m2 Final  
 Calcium 01/01/2019 9.4  8.5 - 10.1 MG/DL Final  
 Protein, total 01/01/2019 7.9  6.4 - 8.2 g/dL Final  
 Albumin 01/01/2019 4.2  3.5 - 5.0 g/dL Final  
  Globulin 01/01/2019 3.7  2.0 - 4.0 g/dL Final  
 A-G Ratio 01/01/2019 1.1  1.1 - 2.2   Final  
 Bilirubin, total 01/01/2019 0.6  0.2 - 1.0 MG/DL Final  
 Bilirubin, direct 01/01/2019 0.2  0.0 - 0.2 MG/DL Final  
 Alk. phosphatase 01/01/2019 97  45 - 117 U/L Final  
 AST (SGOT) 01/01/2019 14* 15 - 37 U/L Final  
 ALT (SGPT) 01/01/2019 18  12 - 78 U/L Final  
 WBC 01/01/2019 10.2  4.1 - 11.1 K/uL Final  
 RBC 01/01/2019 5.64  4.10 - 5.70 M/uL Final  
 HGB 01/01/2019 17.5* 12.1 - 17.0 g/dL Final  
 HCT 01/01/2019 51.6* 36.6 - 50.3 % Final  
 MCV 01/01/2019 91.5  80.0 - 99.0 FL Final  
 MCH 01/01/2019 31.0  26.0 - 34.0 PG Final  
 MCHC 01/01/2019 33.9  30.0 - 36.5 g/dL Final  
 RDW 01/01/2019 12.9  11.5 - 14.5 % Final  
 PLATELET 26/40/3296 903  150 - 400 K/uL Final  
 MPV 01/01/2019 11.1  8.9 - 12.9 FL Final  
 NRBC 01/01/2019 0.0  0  WBC Final  
 ABSOLUTE NRBC 01/01/2019 0.00  0.00 - 0.01 K/uL Final  
 NEUTROPHILS 01/01/2019 58  32 - 75 % Final  
 LYMPHOCYTES 01/01/2019 31  12 - 49 % Final  
 MONOCYTES 01/01/2019 9  5 - 13 % Final  
 EOSINOPHILS 01/01/2019 1  0 - 7 % Final  
 BASOPHILS 01/01/2019 1  0 - 1 % Final  
 IMMATURE GRANULOCYTES 01/01/2019 0  0.0 - 0.5 % Final  
 ABS. NEUTROPHILS 01/01/2019 5.9  1.8 - 8.0 K/UL Final  
 ABS. LYMPHOCYTES 01/01/2019 3.2  0.8 - 3.5 K/UL Final  
 ABS. MONOCYTES 01/01/2019 0.9  0.0 - 1.0 K/UL Final  
 ABS. EOSINOPHILS 01/01/2019 0.1  0.0 - 0.4 K/UL Final  
 ABS. BASOPHILS 01/01/2019 0.1  0.0 - 0.1 K/UL Final  
 ABS. IMM. GRANS. 01/01/2019 0.0  0.00 - 0.04 K/UL Final  
 DF 01/01/2019 AUTOMATED    Final  
 INR 01/01/2019 1.1  0.9 - 1.1   Final  
 Prothrombin time 01/01/2019 10.8  9.0 - 11.1 sec Final  
 
  
RADIOLOGY REPORTS: 
Results from Hospital Encounter encounter on 10/03/15 XR CHEST PORT Narrative **Final Report** 
  
 
ICD Codes / Adm. Diagnosis: 288.60  977.9 / Leukocytosis, unspecified Poisoning by unspecified drug Examination:  CR CHEST PORT  - [id number] - Oct  4 2015  7:50AM 
Accession No:  [id number] Reason:  c line REPORT: 
INDICATION: Central line placement. Intubation. Leukocytosis. COMPARISON: previous day's exam 
 
A single frontal view was obtained. The time of this study is 0743 hrs. . ET  
tube is in satisfactory position. There is a new left internal jugular line  
with its tip overlying the superior vena cava. No pneumothorax. Lungs are  
clear. Heart size normal. NG tube tip is again identified overlying the  
region of the stomach. IMPRESSION: 
No acute finding. Line position satisfactory. Signing/Reading Doctor: Luann Gill ([id number]) ApprovedCrista Gowers ([id number])  Oct  4 2015  8:42AM                    
 
 
   
No results found. MEDICATIONS ALL MEDICATIONS Current Facility-Administered Medications Medication Dose Route Frequency  ziprasidone (GEODON) 20 mg in sterile water (preservative free) 1 mL injection  20 mg IntraMUSCular BID PRN  
 OLANZapine (ZyPREXA) tablet 5 mg  5 mg Oral Q6H PRN  
 benztropine (COGENTIN) tablet 2 mg  2 mg Oral BID PRN  
 benztropine (COGENTIN) injection 2 mg  2 mg IntraMUSCular BID PRN  
 LORazepam (ATIVAN) injection 2 mg  2 mg IntraMUSCular Q4H PRN  
 LORazepam (ATIVAN) tablet 1 mg  1 mg Oral Q4H PRN  
 zolpidem (AMBIEN) tablet 10 mg  10 mg Oral QHS PRN  
 acetaminophen (TYLENOL) tablet 650 mg  650 mg Oral Q4H PRN  
 ibuprofen (MOTRIN) tablet 400 mg  400 mg Oral Q8H PRN  
 magnesium hydroxide (MILK OF MAGNESIA) 400 mg/5 mL oral suspension 30 mL  30 mL Oral DAILY PRN  
 nicotine (NICODERM CQ) 21 mg/24 hr patch 1 Patch  1 Patch TransDERmal DAILY PRN  
  
SCHEDULED MEDICATIONS Current Facility-Administered Medications Medication Dose Route Frequency ASSESSMENT & PLAN The patient, Juma Lim, is a 26 y.o.  male who presents at this time for treatment of the following diagnoses: Patient Active Hospital Problem List: 
MDD recurrent without psychosis, borderline personality d/o 
Depressed, suicidal attempt, chronic emptiness 
Plan: start effexor 37.5 mg daily 
Indiv/milue therapy 
Get collaterals 
psychoeducations 
Substance abuse counselling 
 
 
 
 
I will continue to monitor blood levels (Depakote, Tegretol, lithium, clozapine---a drug with a narrow therapeutic index= NTI) and associated labs for drug therapy implemented that require intense monitoring for toxicity as deemed appropriate based on current medication side effects and pharmacodynamically determined drug 1/2 lives.  
 
 
 
A coordinated, multidisplinary treatment team (includes the nurse, unit pharmcist,  and writer) round was conducted for this initial evaluation with the patient present.  
 
The following regarding medications was addressed during rounds with patient:  
he risks and benefits of the proposed medication. The patient was given the opportunity to ask questions. Informed consent given to the use of the above medications.  
 
I will continue to adjust psychiatric and non-psychiatric medications (see above \"medication\" section and orders section for details) as deemed appropriate & based upon diagnoses and response to treatment.  
 
I have reviewed admission (and previous/old) labs and medical tests in the EHR and or transferring hospital documents. I will continue to order blood tests/labs and diagnostic tests as deemed appropriate and review results as they become available (see orders for details). 
 
I have reviewed old psychiatric and medical records available in the EHR. I Will order additional psychiatric records from other institutions to further elucidate the nature of patient's psychopathology and review once available. 
 
 will gather additional collateral information from friends, family and o/p treatment team to further elucidate the nature of patient's  psychopathology and baselline level of psychiatric functioning. ESTIMATED LENGTH OF STAY:   
3-7days STRENGTHS: 
Access to housing/residential stability and Steady employment SIGNED:   
Edna Beltran MD 
1/3/2019

## 2019-01-03 NOTE — INTERDISCIPLINARY ROUNDS
Behavioral Health Interdisciplinary Rounds Patient Name: Bridget Hong  Age: 26 y.o. Room/Bed:  731/ Primary Diagnosis: <principal problem not specified> Admission Status: Voluntary Readmission within 30 days: no 
Power of  in place: no 
Patient requires a blocked bed: no          Reason for blocked bed: VTE Prophylaxis: No 
 
Mobility needs/Fall risk: no 
Flu Vaccine : no  
Nutritional Plan: no 
Consults:         
Labs/Testing due today?: yes Sleep hours:  6.5 Participation in Care/Groups: *New Admission* Medication Compliant?: *New Admission* PRNS (last 24 hours): Antianxiety Restraints (last 24 hours):  no 
  
CIWA (range last 24 hours): COWS (range last 24 hours): Alcohol screening (AUDIT) completed -   AUDIT Score: 4 If applicable, date SBIRT discussed in treatment team AND documented:  
 
Tobacco - patient is a smoker: Have You Used Tobacco in the Past 30 Days: Yes Illegal Drugs use: Have You Used Any Illegal Substances Over the Past 12 Months: Yes 
 
24 hour chart check complete: *New Admission* Patient goal(s) for today: Develop course of tx with Team  
Treatment team focus/goals:  Complete Psych Social Assessment Progress note LOS:  1  Expected LOS:  
 
Financial concerns/prescription coverage:   TBD Date of last family contact:      
Family requesting physician contact today:   
Discharge plan: Return home Guns in the home:  n/a Outpatient provider(s): Mika Enriquez Participating treatment team members: Dr Irina Del Rio RN and Vikki SchaferD

## 2019-01-03 NOTE — PROGRESS NOTES
100 [facility] 60 Master Treatment Plan for [employer] Date Treatment Plan Initiated: 1/6/19 Treatment Plan Modalities: 
Type of Modality Amount (x minutes) Frequency (x/week) Duration (x days) Name of Responsible Staff Community & wrap-up meetings to encourage peer interactions [id number] [facility] Group psychotherapy to assist in building coping skills and internal controls 60 [phone] [facility] Therapeutic activity groups to build coping skills 60 7 1 Jhonny Prado Psychoeducation in group setting to address:  
Med[facility] Coping skills Relaxation techniques Symptom management Discharge planning 1400 [facility] 71 Spirituality 2209 [facility] [place] 60 1 1 volunteer Recovery/AA/NA 
    volunteer Physician medication management 15 7 1 Dr. Chay Lanier Family meeting/discharge planning [place] Problem: Depressed Mood (Adult/Pediatric) these goals will be met by 1/6/19 Goal: *STG: Participates in treatment plan Outcome: Progressing Towards Goal 
Out on unit social w peers and staff. Mood and affect anxious to sad. Daily goal is to talk about medications and detox off of bezos. Goal: *STG: Verbalizes anger, guilt, and other feelings in a constructive manor Outcome: Progressing Towards Goal 
Anxiety, low self esteem and workthlessness Goal: *STG: Attends activities and groups Outcome: Progressing Towards Goal 
engaged Goal: *STG: Demonstrates reduction in symptoms and increase in insight into coping skills/future focused Outcome: Progressing Towards Goal 
Denies SI, no self harming behaviors, symptoms such as hopelessness resolved, benzo deprendence will be addressed with detox protocol, worthlessness decreased. Insight into benefits of therapy Goal: Interventions Outcome: Progressing Towards Goal 
 Staff focus is on coping skills education and medication education

## 2019-01-03 NOTE — PROGRESS NOTES
GROUP THERAPY PROGRESS NOTE Wolfgang Porter is participating in Goals Group and Community. Group time: 20 minutes Personal goal for participation: Track moods and symptoms in journal 
 
Goal orientation: personal 
 
Group therapy participation: active Therapeutic interventions reviewed and discussed: Discussed unit schedule and roles of staff members. Discussed treatment team and made suggestions on what to discuss with staff and doctor. Discussed importance of using time in hospital to reflect on self and how they might want to improve the situation they are in. Discussed daily goals and importance of setting definable, measurable, realistic goals rather than setting one large new years resolution. Shared goals with peers to create discussion and to share ideas with one another. Impression of participation: Positively contributed to conversation with staff and peers, actively listened

## 2019-01-03 NOTE — BH NOTES
PSYCHOSOCIAL ASSESSMENT 
:Patient identifying info: 
Arun Blanton is a 23 y.o., male admitted 1/2/2019  6:42 PM  
 
Presenting problem and precipitating factors:  Pt was admitted to the hospital ( voluntarily) due to de[depression,SI attempt via overdose. Pt told his mother that he took 25-30 5mg tabs of Adderall  in an attempt to kill himself. Pt feels recent breakup with his girlfriend triggered depression and suicide thoughts. Pt had a previous overdose and he was admitted to a medical unit but this is his first admission. 
 
Mental status assessment: Cooperative, forthcoming, tearful at times when discussing the death - grief felt over SI by his counselor ad willing to have meds changed 
 
Collateral information:  
 
Current psychiatric /substance abuse providers and contact info: Dr. Moss @ [facility]  
 
Previous psychiatric/substance abuse providers and response to treatment: Yes  
 
Family history of mental illness or substance abuse:  
 
Substance abuse history:   Yes, THC ( daily), alcohol and tobacco( daily). His UDS was < 10 for ETOH. 
 
Social History  
 
Tobacco Use  
• Smoking status: Never Smoker  
• Smokeless tobacco: Never Used  
Substance Use Topics  
• Alcohol use: Yes  
  Comment: social  
 
 
History of biomedical complications associated with substance abuse : 
 
Patient's current acceptance of treatment or motivation for change: 
 
Family constellation: Parents  
 
Is significant other involved? No recent breakup 
 
Describe support system: Pt's greatest support comes from his parents and Dr Moss  
 
Describe living arrangements and home environment: Pt is single, lives with a parent and he has no children. Pt does plan to return home 
 
Health issues:  
Hospital Problems  Date Reviewed: 10/8/2015  
 None  
  
 
 
Trauma history: Pt's counselor committed suicide and he described that as the most horrible event he has ever known.  
 He denied being exposed to any forms of abuse Legal issues:  
 
History of  service:  N/A Financial status: Employment Shinto/cultural factors: Not voiced Education/work history: HS grad and one yr of college Have you been licensed as a health care professional (current or ):  No 
 
Leisure and recreation preferences:  
 
Describe coping skills: Ineffective and poor judgement Sriia Tam 1/3/2019

## 2019-01-03 NOTE — PROGRESS NOTES
Problem: Depressed Mood (Adult/Pediatric) Goal: *STG: Participates in treatment plan Outcome: Progressing Towards Goal 
1530: Greeted patient on unit in room. Appears in no acute distress. No voiced concerns at present. Will continue to monitor on Q 15 minute safety checks. 1900: Alert, vitals stable, wnl.  
Medication and meal compliant. Prn ativan 1 mg given for anxiety. Patient appears to be calmer at the present time. 2130: Attended reflections group. Compliant with night time medications.

## 2019-01-03 NOTE — BH NOTES
26year old voluntary admission arrived 1830 from 6th floor Pt reportedly took intentional overdose of 25-30 tabs of prescribed Adderall 5mg on Sunday 12/31/18 around 0400, pt reported he immediately told his mother. Pt cites this overdose was a combination of built up feelings of worthlessness as well as a cry for help. Pt reports feelings of hopelessness; helplessness, insomnia, and depression have increased significantly over past 6 weeks. Pt reports night sweats when waking in the middle of the night, pt uncertain if it is nightmares or anxiety Pt cites significant other broke up with him on 11/11/18, and did not explain to him the reason for the breakup, which has left him feeling increasingly worthless and lonely. ( pt did describe his previous significant other as troubled, surrounded by a family of drug addicts), and describes  some components of a codependent relationship when talking about relationship) Pt reports I always think I am not good enough I focus on all my mistakes, instead of my achievements I give everyone credit but [facility] Pt denies current SI citing I couldnt do that to my family and cites mother and stepfather as preventative factors. Agrees to inform staff if self injurious thoughts occur Pt reports being diagnosed with numerous psychiatric diagnoses including: depression, bipolar, ADHD Pt has been seeing Dr. Eladia Villaseñor since age 27, reportedly taking Xanax 0.5mg prn  (usually about 3-5 days per week) and Adderall 5mg 1-2 tabs daily for the past 5 months Illicit substance: reportedly first used thc at 17. Began daily use of  
marijuana smoking 1 gram daily since age 27 Alcohol use: pt reports a dislike for alcohol, last use [place]. Reports drinking about once a month on average socidally with friends, up to 5 drinks on occasion, alcohol screening unremarkable Tobacco: 2-3 cigarettes daily and unknown amount of vaping via vape device Pt cites 20 pound weight loss over past 9 months Psychiatric history: first saw outpatient counseling in 6th and 7th grade for ADHD. No reported previous psychiatric inpatient admissions Self-injury history: intentional overdose on 20-30 lamictal tablets at age 27, reportedly hospitalized here at [facility] for about 8 days Trauma history: counselor, with whom patient reports a great relationship with from ages [age], committed suicide when patient was 18, and patient reports constantly thinking about this loss, and one year later attempted suicide as noted by intentional overdose of lamictal 
 
Medical history: Kaylen Jordan diagnosed with ADD, bipolar, and Depression Support system: resides with mother and stepfather, also reports several friends are supportive and he enjoys hanging out with these friends Employment: works as  at [employer], however patient cites I am not sure I still have a job <related to hospitalization> Education:  1 semester in [address] [place] [address] in [facility], following completion of high school. Patient oriented to unit, family provided booklet outlining visiting times, phone hours, and unit number, and confidentiality code.

## 2019-01-03 NOTE — BH NOTES
Primary Nurse Yannick Bruno RN and Adilson Myers RN performed a dual skin assessment on this patient No impairment noted Ray score is 23 Pressure Injury Documentation 
(COMPLETE ONE LABEL PER PRESSURE INJURY) For further information, please review corresponding Wound Care flowsheet. Angelica Hicks has: No pressure ulcer Yannick Bruno, RN

## 2019-01-03 NOTE — BH NOTES
PRN Medication Documentation Specific patient behavior that led to need for PRN medication:  
Pt reporting increasing anxiety Staff interventions attempted prior to PRN being given: provided food and fluids, decreased stimuli PRN medication given: ativan 1mg po prn at 2208 Patient response/effectiveness of PRN medication:

## 2019-01-03 NOTE — PROGRESS NOTES
Admission Medication Reconciliation: 
 
Information obtained from:  Patient interview/[facility] /[facility] Comments/Recommendations: Updated PTA meds/reviewed patient's allergies. 1)  Patient reports that he does not like being prescribed alprazolam and Adderall. He is agreeable to discontinuing both medications. He was recently started on vortioxetine (filled 12/20/18), reports taking for 4 days. The patient has been prescribed stimulants and benzodiazepines together since 8/2017.  
 
2)  Medication changes (since last review): Added 
- vortioxetine 10mg every evening 
- adderall 5mg tablets, 1.5-2 tablets BID PRN Adjusted 
- alprazolam 0.5mg BID PRN (from TID PRN) - quetiapine 25 tablets, 1-2 tablets QHS Removed 
- none Allergies:  Patient has no known allergies. Significant PMH/Disease States:  
Past Medical History:  
Diagnosis Date  Anxiety  Depression  Sleep disorder  Substance abuse ([facility] [address]  Suicidal thoughts Chief Complaint for this Admission:  No chief complaint on file. Prior to Admission Medications:  
Prior to Admission Medications Prescriptions Last Dose Informant Patient Reported? Taking? ALPRAZolam (XANAX) 0.5 mg tablet   Yes No  
Sig: Take 0.5 mg by mouth two (2) times daily as needed for Anxiety. QUEtiapine (SEROQUEL) 25 mg tablet   Yes No  
Sig: Take 25-50 mg by mouth nightly. dextroamphetamine-amphetamine (ADDERALL) 5 mg tablet   Yes Yes Sig: Take 7.5-10 mg by mouth two (2) times daily as needed. vortioxetine (TRINTELLIX) 10 mg tablet   Yes Yes Sig: Take 10 mg by mouth every evening. Facility-Administered Medications: None Malathi Hernandez, PharmD, BCPP, BCPS Clinical Pharmacy Specialist, [facility]

## 2019-01-03 NOTE — PROGRESS NOTES
Problem: Discharge Planning Goal: *Discharge to safe environment Outcome: Progressing Towards Goal 
Patient identifies home as a safe environment. Patient will return home upon discharge. Goal: *Knowledge of medication management Outcome: Progressing Towards Goal 
Patient verbalizes understanding of current medication regimen. Patient agrees to take medications as prescribed. Goal: *Knowledge of discharge instructions Outcome: Progressing Towards Goal 
Patient verbalizes understanding of goals for treatment and safe discharge.

## 2019-01-04 VITALS
HEART RATE: 73 BPM | RESPIRATION RATE: 16 BRPM | DIASTOLIC BLOOD PRESSURE: 78 MMHG | TEMPERATURE: 97.9 F | SYSTOLIC BLOOD PRESSURE: 116 MMHG | OXYGEN SATURATION: 98 %

## 2019-01-04 PROCEDURE — 74011250637 HC RX REV CODE- 250/637: Performed by: PSYCHIATRY & NEUROLOGY

## 2019-01-04 RX ORDER — VENLAFAXINE HYDROCHLORIDE 37.5 MG/1
37.5 CAPSULE, EXTENDED RELEASE ORAL
Qty: 30 CAP | Refills: 0 | Status: SHIPPED | OUTPATIENT
Start: 2019-01-05

## 2019-01-04 RX ORDER — HYDROXYZINE 25 MG/1
25 TABLET, FILM COATED ORAL
Qty: 30 TAB | Refills: 0 | Status: SHIPPED | OUTPATIENT
Start: 2019-01-04 | End: 2019-01-14

## 2019-01-04 RX ADMIN — VENLAFAXINE HYDROCHLORIDE 37.5 MG: 37.5 CAPSULE, EXTENDED RELEASE ORAL at 08:50

## 2019-01-04 NOTE — BH NOTES
PSYCHIATRIC PROGRESS NOTE Patient Name  Jai Middleton Date of Birth 1995 [facility] [id number] Medical Record Number  [id number] Age  26 y.o. PCP Selwyn Tabor MD  
Admit date:  1/2/2019 Room Number  300/27  @ [facility]  
Date of Service  1/4/2019 E & M PROGRESS NOTE: 15 mins HISTORY  
   
REASON FOR HOSPITALIZATION: 
CC:  Pt admitted under a voluntary basis for severe depression with suicidal attempt by overdosing on adderall pills. Pt trasfered from medical floor after being there for 2 days. HISTORY OF PRESENT ILLNESS:   
The patient, Jai Middleton, is a 26 y.o. WHITE [place]  male with a past psychiatric history significant for history of anxiety and depression who presented to the ED after he overdosed on Adderall. Pt reports he was not feeling good after breakup with his girlfriend and decided to  end his life by taking too many pills, but then immediately realized that it was a mistake and told his mother. Pt reports he feels emptiness inspite fo many things going good in his life. Pt reports h/o impulisvity in the past too. Pt reports he feels he is not good enough as compare to his siblings. He states he has been depressed and anxious for years but more so these past few months. Pt reports difficulty managing his emotions. Pt became tearful talking about loosing his Counsellor of 3 years who killed himself and his parent's divorce in middle school which was difficult for him. Jai Middleton  currently denied suicidal/homicidal ideations and plans. Pt denied auditory and visual hallucinations, Pt reports recently being started on trintellex in December. Pt deneid OCD, manic Ptsd symptoms Reports using marijuna daily and vaping. 1/4/19: Pt reports feeling much better, no side effects to meds reported. Pt is future oriented, agreeing to go for therapy.  Family feels safe taking pt home. Pt wants o go home, regrets his suicidal attempts. Pt was able to verbalize his feeling including his feeing around his therapist committing suicide. 
  
  
Past Psychiatric history: h/o depression, adhd, past meds vyvanse, ristalin, valium, xanax Hospitalizations: no 
Suicidal attempts: yes 3 years ago overdose was admitted to medical 
Substance abuse: THC, gemae 
  
Family History of mental illness:none 
  
Social History: 
Patient is born and raised in [address] [place] [address],[address]. No abuse. .. Patient is single. Pt is employed, support her/himself Legal issues: none SIDE EFFECTS: (reviewed/updated 1/4/2019) None reported or admitted to. No noted toxicity with use of Depakote/Tegretol/lithium/Clozaril/TCAs ALLERGIES:(reviewed/updated 1/4/2019) No Known Allergies MEDICATIONS PRIOR TO ADMISSION:(reviewed/updated 1/4/2019) Medications Prior to Admission Medication Sig  
 dextroamphetamine-amphetamine (ADDERALL) 5 mg tablet Take 7.5-10 mg by mouth two (2) times daily as needed.  vortioxetine (TRINTELLIX) 10 mg tablet Take 10 mg by mouth every evening.  ALPRAZolam (XANAX) 0.5 mg tablet Take 0.5 mg by mouth two (2) times daily as needed for Anxiety.  QUEtiapine (SEROQUEL) 25 mg tablet Take 25-50 mg by mouth nightly. PAST MEDICAL HISTORY: Past medical history from the initial psychiatric evaluation has been reviewed (reviewed/updated 1/4/2019) with no additional updates (I asked patient and no additional past medical history provided). Past Medical History:  
Diagnosis Date  Anxiety  Depression  Sleep disorder  Substance abuse ([facility] [address]  Suicidal thoughts Past Surgical History:  
Procedure Laterality Date  HX TONSIL AND ADENOIDECTOMY  INTUBATE PATIENT  10/6/2015 SOCIAL HISTORY: Social history from the initial psychiatric evaluation has been reviewed (reviewed/updated 1/4/2019) with no additional updates (I asked patient and no additional social history provided). Social History Socioeconomic History  Marital status: SINGLE Spouse name: Not on file  Number of children: Not on file  Years of education: Not on file  Highest education level: Not on file Social Needs  Financial resource strain: Not on file  Food insecurity - worry: Not on file  Food insecurity - inability: Not on file  Transportation needs - medical: Not on file  Transportation needs - non-medical: Not on file Occupational History  Not on file Tobacco Use  Smoking status: Never Smoker  Smokeless tobacco: Never Used Substance and Sexual Activity  Alcohol use: Yes Comment: social  
 Drug use: Yes Types: Marijuana  Sexual activity: Not on file Other Topics Concern [address] Service Not Asked  Blood Transfusions Not Asked  Caffeine Concern Not Asked  Occupational Exposure Not Asked Demetra Delroy Hazards Not Asked  Sleep Concern Not Asked  Stress Concern Not Asked  Weight Concern Not Asked  Special Diet Not Asked  Back Care Not Asked  Exercise Not Asked  Bike Helmet Not Asked  Seat Belt Not Asked  Self-Exams Not Asked Social History Narrative  Not on file FAMILY HISTORY: Family history from the initial psychiatric evaluation has been reviewed (reviewed/updated 1/4/2019) with no additional updates (I asked patient and no additional family history provided). History reviewed. No pertinent family history. REVIEW OF SYSTEMS: (reviewed/updated 1/4/2019) Appetite:good Sleep: good All other Review of Systems: Negative except Sarahstvandana MENTMENTAL STATUS EXAM (MSE): MSE FINDINGS ARE WITHIN NORMAL LIMITS (WNL) UNLESS OTHERWISE STATED BELOW. ( ALL OF THE BELOW CATEGORIES OF THE MSE HAVE BEEN REVIEWED (reviewed 1/3/2019) AND UPDATED AS DEEMED APPROPRIATE ) General Presentation age appropriate and casually dressed, cooperative Orientation oriented to time, place and person Vital Signs  See below (reviewed 1/3/2019); Vital Signs (BP, Pulse, & Temp) are within normal limits if not listed below. Gait and Station Stable/steady, no ataxia Musculoskeletal System No extrapyramidal symptoms (EPS); no abnormal muscular movements or Tardive Dyskinesia (TD); muscle strength and tone are within normal limits Language No aphasia or dysarthria Speech:  normal pitch and normal volume Thought Processes logical; normal rate of thoughts; good abstract reasoning/computation Thought Associations goal directed Thought Content free of delusions Suicidal Ideations no plan  and no intention Homicidal Ideations no plan  and no intention Mood:  Stable brighter Affect:  brighter Memory recent  good Memory remote:  good Concentration/Attention:  distractable Fund of Knowledge average Insight:  improving Reliability good Judgment:  improving VITALS:    
Patient Vitals for the past 24 hrs: 
 Temp Pulse Resp BP SpO2  
01/04/19 0800 97.5 °F (36.4 °C) 83 16 127/84 98 % 01/03/19 2000 97.5 °F (36.4 °C) 98 16 117/75   
01/03/19 1600 98 °F (36.7 °C) 97 16 111/75 98 % 01/03/19 1156 97.9 °F (36.6 °C) (!) 108 16 136/76  Wt Readings from Last 3 Encounters:  
01/02/19 53.4 kg (117 lb 11.6 oz) 10/06/15 63.7 kg (140 lb 6.4 oz) (33 %, Z= -0.45)* * Growth percentiles are based on CDC (Boys, 2-20 Years) data. Temp Readings from Last 3 Encounters:  
01/04/19 97.5 °F (36.4 °C)  
01/02/19 97.9 °F (36.6 °C)  
10/12/15 98.2 °F (36.8 °C) BP Readings from Last 3 Encounters:  
01/04/19 127/84  
01/02/19 126/71  
10/12/15 103/63 (10 %, Z = -1.29 /  31 %, Z = -0.49)*  
 
*BP percentiles are based on the August 2017 AAP Clinical Practice Guideline for boys Pulse Readings from Last 3 Encounters:  
01/04/19 83  
01/02/19 92  
10/12/15 93 DATA LABORATORY DATA:(reviewed/updated 1/4/2019) No results found for this or any previous visit (from the past 24 hour(s)). No results found for: VALF2, VALAC, VALP, VALPR, DS6, CRBAM, CRBAMP, CARB2, XCRBAM 
No results found for: LITHM  
RADIOLOGY REPORTS:(reviewed/updated 1/4/2019) No results found. MEDICATIONS ALL MEDICATIONS:  
Current Facility-Administered Medications Medication Dose Route Frequency  venlafaxine-SR (EFFEXOR-XR) capsule 37.5 mg  37.5 mg Oral DAILY WITH BREAKFAST  melatonin tablet 3 mg  3 mg Oral QHS  hydrOXYzine HCl (ATARAX) tablet 25 mg  25 mg Oral QID PRN  
 ziprasidone (GEODON) 20 mg in sterile water (preservative free) 1 mL injection  20 mg IntraMUSCular BID PRN  
 OLANZapine (ZyPREXA) tablet 5 mg  5 mg Oral Q6H PRN  
 benztropine (COGENTIN) tablet 2 mg  2 mg Oral BID PRN  
 benztropine (COGENTIN) injection 2 mg  2 mg IntraMUSCular BID PRN  
 LORazepam (ATIVAN) injection 2 mg  2 mg IntraMUSCular Q4H PRN  
 acetaminophen (TYLENOL) tablet 650 mg  650 mg Oral Q4H PRN  
 ibuprofen (MOTRIN) tablet 400 mg  400 mg Oral Q8H PRN  
 magnesium hydroxide (MILK OF MAGNESIA) 400 mg/5 mL oral suspension 30 mL  30 mL Oral DAILY PRN  
 nicotine (NICODERM CQ) 21 mg/24 hr patch 1 Patch  1 Patch TransDERmal DAILY PRN  
  
SCHEDULED MEDICATIONS:  
Current Facility-Administered Medications Medication Dose Route Frequency  venlafaxine-SR (EFFEXOR-XR) capsule 37.5 mg  37.5 mg Oral DAILY WITH BREAKFAST  melatonin tablet 3 mg  3 mg Oral QHS  
  
 
ASSESSMENT & PLAN [address] [facility] patient, Carmen Dale, is a 26 y.o.  male who presents at this time for treatment of the following diagnoses: 
Patient Active Hospital Problem List: MDD recurrent without psychosis, borderline personality d/o Depressed, suicidal attempt, chronic emptiness Plan: start effexor 37.5 mg daily Indiv/milue therapy Get collaterals 
psychoeducations Substance abuse counseling 1/4/19: Pt will be discharged home with after care as per  instruction. Pt wants to go home, pt psychoeducated regarding meds, therapy and crisis plan I will continue to monitor blood levels (Depakote, Tegretol, lithium, clozapine---a drug with a narrow therapeutic index= NTI) and associated labs for drug therapy implemented that require intense monitoring for toxicity as deemed appropriate based on current medication side effects and pharmacodynamically determined drug 1/2 lives. In summary, Radha Cleaning, is a 26 y.o.  male who presents with a severe exacerbation of the principal diagnosis of Major depression, recurrent ([facility] [address] Patient's condition is orsening/not improving/not stable improving. Patient requires continued inpatient hospitalization for further stabilization, safety monitoring and medication management. I will continue to coordinate the provision of individual, milieu, occupational, group, and substance abuse therapies to address target symptoms/diagnoses as deemed appropriate for the individual patient. A coordinated, multidisplinary treatment team round was conducted with the patient (this team consists of the nurse, psychiatric unit pharmcist,  and writer). Complete current electronic health record for patient has been reviewed today including consultant notes, ancillary staff notes, nurses and psychiatric tech notes. uicide risk assessment completed and patient deemed to be of low risk for suicide at this time. The following regarding medications was addressed during rounds with patient:  
the risks and benefits of the proposed medication. The patient was given the opportunity to ask questions. Informed consent given to the use of the above medications.  Will continue to adjust psychiatric and non-psychiatric medications (see above \"medication\" section and orders section for details) as deemed appropriate & based upon diagnoses and response to treatment. I will continue to order blood tests/labs and diagnostic tests as deemed appropriate and review results as they become available (see orders for details and above listed lab/test results). I will order psychiatric records from previous [facility] hospitals to further elucidate the nature of patient's psychopathology and review once available. I will gather additional collateral information from friends, family and o/p treatment team to further elucidate the nature of patient's psychopathology and baselline level of psychiatric functioning. I certify that this patient's inpatient psychiatric hospital services furnished since the previous certification were, and continue to be, required for treatment that could reasonably be expected to improve the patient's condition, or for diagnostic study, and that the patient continues to need, on a daily basis, active treatment furnished directly by or requiring the supervision of inpatient psychiatric facility personnel. In addition, the hospital records show that services furnished were intensive treatment services, admission or related services, or equivalent services. EXPECTED DISCHARGE DATE/DAY: TBD  
 
DISPOSITION: Home Signed By:  
Benito Bruce MD 
1/4/2019

## 2019-01-04 NOTE — BH NOTES
GROUP THERAPY PROGRESS NOTE 
 
Arun Blanton participated in a Process Group on the General Unit with a focus on grief and loss, planning for the day and looking towards discharge. 
  
Group time: 75 minutes. 
  
Personal goal for participation: To increase the capacity to prepare for the day and consider the impact of grief and loss in one’s life. 
  
Goal orientation: The patient will be able to prepare for the day and review common responses to loss. 
  
Group therapy participation: When prompted, this patient actively participated in the group. 
  
Therapeutic interventions reviewed and discussed: The group members were asked to introduce themselves by first names, share how they are feeling, and what they hope to focus on during the day. They were also provided a handout, The Grief Loop, to review and share their own experiences with change and loss in their own lives. The handout reviewed both short-term responses to grief and longer potential responses. In the initial grief loop, it was suggested that feelings of protest, depression, and despair are common with the loss of a loved one or in regards to anything one has lost. Change requires almost always a loss, even if it is positive and sought for one’s benefit. In this initial grief loop one might then get to acceptance, reconciliation, and/or new relationships. There are breaks in the grieving process and periods of remembrance, especially around holidays or other special occasions or music. The longer-term grief loop described the individual nature of grief and recovery, the construction of a new reality, anniversaries, de-investing and/or conscious personal rituals to help one move on over time with one’s life.       
 
Impression of participation: The patient said he was feeling \"better\" and that his visit with his mother and step-father went well last evening; \"It was the first time they had seen me not in a hospital bed since coming  into the hospital.\" He planned to get a therapist and stay on the recommended medication. He was encouraged to identify his warning signs for his depression. He responded by saying \"hunger\" is one of his warning signs. He was encouraged to think of HALT, hungry, angry, lonely, or tired as feelings that might encourage him to stop what he is doing and maybe seek someone to talk to. The patient was alert, generally oriented, and engaged. He expressed no current SI/HI and displayed no overt psychosis. His affect was not as depressed or anxious as he had been when admitted to the hospital. His mood matched his affect.

## 2019-01-04 NOTE — BH NOTES
Behavioral Health Transition Record to Provider Patient Name: Luciana Valdivia YOB: 1995 Medical Record Number: [id number] Date of Admission: 1/2/2019 Date of Discharge: 1/4/2019 Attending Provider: Valeri Luis MD 
Discharging Provider: Valeri Luis MD 
To contact this individual call [phone] and ask the  to page. If unavailable, ask to be transferred to [facility] Provider on call. [facility] Provider will be available on call 24/7 and during holidays. Primary Care Provider: Dayo Palacio MD 
 
No Known Allergies Reason for Admission: Pt admitted under a voluntary basis for severe depression with suicidal attempt by overdosing on adderall pills. Pt trasfered from medical floor after being there for 2 days. Admission Diagnosis: Major Depressive Disorder Major depression, recurrent ([facility] [address] Borderline personality disorder ([facility] [address] * No surgery found * Results for orders placed or performed during the hospital encounter of 01/02/19 LIPID PANEL Result Value Ref Range LIPID PROFILE Cholesterol, total 116 <200 MG/DL Triglyceride 96 <150 MG/DL  
 HDL Cholesterol 39 MG/DL  
 LDL, calculated 57.8 0 - 100 MG/DL VLDL, calculated 19.2 MG/DL  
 CHOL/HDL Ratio 3.0 0 - 5.0 GLUCOSE, FASTING Result Value Ref Range Glucose 66 65 - 100 MG/DL  
TSH 3RD GENERATION Result Value Ref Range TSH 1.19 0.36 - 3.74 uIU/mL Immunizations administered during this encounter:  
Immunization History Administered Date(s) Administered  Influenza Vaccine (Quad) PF 10/12/2015 Screening for Metabolic Disorders for Patients on Antipsychotic Medications 
(Data obtained from the EMR) Estimated Body Mass Index Estimated body mass index is 19.74 kg/m² as calculated from the following: 
  Height as of 12/30/18: 5' 4.75\" (1.645 m). Weight as of an earlier encounter on 1/2/19: 53.4 kg (117 lb 11.6 oz). Vital Signs/Blood Pressure Visit Vitals /78 (BP 1 Location: Right arm, BP Patient Position: Sitting) Pulse 73 Temp 97.9 °F (36.6 °C) Resp 16 SpO2 98% Blood Glucose/Hemoglobin A1c Lab Results Component Value Date/Time Glucose 66 2019 06:01 AM  
 
No results found for: HBA1C, HGBE8, VXD2DWRS Lipid Panel Lab Results Component Value Date/Time Cholesterol, total 116 2019 06:01 AM  
 HDL Cholesterol 39 2019 06:01 AM  
 LDL, calculated 57.8 2019 06:01 AM  
 Triglyceride 96 2019 06:01 AM  
 CHOL/HDL Ratio 3.0 2019 06:01 AM  
 
  
Discharge Diagnosis: Major depression, recurrent (ICD-10-CM: 33.9); Borderline personality disorder (ICD-10-CM: F60.3) Discharge Plan: Patient discharged home into the care of family. [address] [place] [address] : 1995 MRN: [id number] The patient Carmen Dale exhibits the ability to control behavior in a less restrictive environment. Patient's level of functioning is improving. No assaultive/destructive behavior has been observed for the past 24 hours. No suicidal/homicidal threat or behavior has been observed for the past 24 hours. There is no evidence of serious medication side effects. Patient has not been in physical or protective restraints for at least the past 24 hours. If weapons involved, how are they secured? N/A Is patient aware of and in agreement with discharge plan? Yes Arrangements for medication:  Prescriptions given to patient. Copy of discharge instructions to  provider?:  Fax  [address]  @ [phone] Arrangements for transportation home:  Parent is providing transportation Keep all follow up appointments as scheduled, continue to take prescribed medications per physician instructions. Mental health crisis number:  [phone] or your local mental health crisis line number at [phone] Discharge Medication List and Instructions: Current Discharge Medication List  
  
START taking these medications Details  
hydrOXYzine HCl (ATARAX) 25 mg tablet Take 1 Tab by mouth two (2) times daily as needed for Anxiety for up to 10 days. Qty: 30 Tab, Refills: 0  
  
venlafaxine-SR (EFFEXOR-XR) 37.5 mg capsule Take 1 Cap by mouth daily (with breakfast). Qty: 30 Cap, Refills: 0 STOP taking these medications  
  
 dextroamphetamine-amphetamine (ADDERALL) 5 mg tablet Comments:  
Reason for Stopping:   
   
 vortioxetine (TRINTELLIX) 10 mg tablet Comments:  
Reason for Stopping: ALPRAZolam (XANAX) 0.5 mg tablet Comments:  
Reason for Stopping: QUEtiapine (SEROQUEL) 25 mg tablet Comments:  
Reason for Stopping:   
   
  
 
 
Unresulted Labs (24h ago, onward) None To obtain results of studies pending at discharge, please contact [phone] Follow-up Information Follow up With Specialties Details Why Contact Iza Allen  On 2/5/2019 You have a 5:00pm appointment with the psychiatrist for medication management. If you need to cancel or reschedule, please call at least 24 hours in advance or you will not be able to be rescheduled. [address] [facility] [address], [address] [place] [address] [address] 
[phone] Marin Phillips MD Pediatrics   [address] [place] [address] 
[phone] Mark Wise  On 1/8/2019 You have a 1:00pm appointment for individual therapy. Please arrive 20 minutes early to complete new client paperwork. Remember to bring your photo ID and insurance card. [address] [facility] [address], [address] [place], [facility] [address] 
[phone] Advanced Directive:  
Does the patient have an appointed surrogate decision maker? No 
Does the patient have a Medical Advance Directive? No 
Does the patient have a Psychiatric Advance Directive?  No 
If the patient does not have a surrogate or Medical Advance Directive AND Psychiatric Advance Directive, the patient was offered information on these advance directives Yes and Patient declined to complete Patient Instructions: Please continue all medications until otherwise directed by physician. Tobacco Cessation Discharge Plan:  
Is the patient a smoker and needs referral for smoking cessation? Yes Patient referred to the following for smoking cessation with an appointment? Refused Patient was offered medication to assist with smoking cessation at discharge? Refused Was education for smoking cessation added to the discharge instructions? Yes Alcohol/Substance Abuse Discharge Plan:  
Does the patient have a history of substance/alcohol abuse and requires a referral for treatment? Yes Patient referred to the following for substance/alcohol abuse treatment with an appointment? Yes, Patient has an appointment with Karol Abreu on 1/8/19 at 1:00pm. 
Patient was offered medication to assist with alcohol cessation at discharge? Refused Was education for substance/alcohol abuse added to discharge instructions? Yes Patient discharged to Home; discussed with patient/caregiver and provided to the patient/caregiver either in hard copy or electronically.

## 2019-01-04 NOTE — PROGRESS NOTES
Problem: Depressed Mood (Adult/Pediatric) Goal: *STG: Participates in treatment plan Outcome: Progressing Towards Goal 
Out on unit social w peers and staff. Mood and affect brighter smiling and engaged. Positive attitude, actively working on crisis plan and practicing healthy coping skills. Daily goal is to d/c home. Staff focus is on coping skills education and d/c planning

## 2019-01-04 NOTE — PROGRESS NOTES
Problem: Falls - Risk of 
Goal: *Absence of Falls Document Chase Jackson Fall Risk and appropriate interventions in the flowsheet. Outcome: Progressing Towards Goal 
Fall Risk Interventions: 
Medication Interventions: Teach patient to arise slowly Elimination Interventions: Bed/chair exit alarm Received pt lying in bed with eyes closed, appear to be asleep. No pain or distress noted. Respirations even and unlabored. Will continue to monitor q15 min for safety.

## 2019-01-04 NOTE — DISCHARGE INSTRUCTIONS
DISCHARGE SUMMARY    NAME:Arun Blanton  : 1995  MRN: [id number]    The patient Bakari Donato exhibits the ability to control behavior in a less restrictive environment. Patient's level of functioning is improving. No assaultive/destructive behavior has been observed for the past 24 hours. No suicidal/homicidal threat or behavior has been observed for the past 24 hours. There is no evidence of serious medication side effects. Patient has not been in physical or protective restraints for at least the past 24 hours. If weapons involved, how are they secured? N/A    Is patient aware of and in agreement with discharge plan? Yes    Arrangements for medication:  Prescriptions given to patient. Copy of discharge instructions to  provider?:  Fax  [address]  @ [phone]    Arrangements for transportation home:  Parent is providing transportation    Keep all follow up appointments as scheduled, continue to take prescribed medications per physician instructions. Mental health crisis number:  [phone] or your local mental health crisis line number at [address] [facility] from Nurse    PATIENT INSTRUCTIONS:    What to do at Home:  Recommended activity: Activity as tolerated,     If you experience any of the following symptoms thought of harming self, feeling overwhelmed with hopelessness, please follow up with your assigned providers and local mariana number at [phone]. *  Please give a list of your current medications to your Primary Care Provider. *  Please update this list whenever your medications are discontinued, doses are      changed, or new medications (including over-the-counter products) are added. *  Please carry medication information at all times in case of emergency situations.     These are general instructions for a healthy lifestyle:    No smoking/ No tobacco products/ Avoid exposure to second hand smoke  Surgeon General's Warning:  Quitting smoking now greatly reduces serious risk to your health. Obesity, smoking, and sedentary lifestyle greatly increases your risk for illness    A healthy diet, regular physical exercise & weight monitoring are important for maintaining a healthy lifestyle    You may be retaining fluid if you have a history of heart failure or if you experience any of the following symptoms:  Weight gain of 3 pounds or more overnight or 5 pounds in a week, increased swelling in our hands or feet or shortness of breath while lying flat in bed. Please call your doctor as soon as you notice any of these symptoms; do not wait until your next office visit. Recognize signs and symptoms of STROKE:    F-face looks uneven    A-arms unable to move or move unevenly    S-speech slurred or non-existent    T-time-call 911 as soon as signs and symptoms begin-DO NOT go       Back to bed or wait to see if you get better-TIME IS BRAIN. Warning Signs of HEART ATTACK     Call 911 if you have these symptoms:   Chest discomfort. Most heart attacks involve discomfort in the center of the chest that lasts more than a few minutes, or that goes away and comes back. It can feel like uncomfortable pressure, squeezing, fullness, or pain.  Discomfort in other areas of the upper body. Symptoms can include pain or discomfort in one or both arms, the back, neck, jaw, or stomach.  Shortness of breath with or without chest discomfort.  Other signs may include breaking out in a cold sweat, nausea, or lightheadedness. Don't wait more than five minutes to call 911 - MINUTES MATTER! Fast action can save your life. Calling 911 is almost always the fastest way to get lifesaving treatment. Emergency Medical Services staff can begin treatment when they arrive -- up to an hour sooner than if someone gets to the hospital by car. The discharge information has been reviewed with the patient. The patient verbalized understanding.   Discharge medications reviewed with the patient and appropriate educational materials and side effects teaching were provided.  ___________________________________________________________________________________________________________________________________

## 2019-01-04 NOTE — DISCHARGE SUMMARY
PSYCHIATRIC DISCHARGE SUMMARY         IDENTIFICATION:    Patient Name  Meera Goldsmith   Date of Birth 1995   [facility] [id number]   Medical Record Number  [id number]      Age  26 y.o. PCP Mary Markham MD   Admit date:  1/2/2019    Discharge date: 1/4/2019   Room Number  731/02  @ [facility]   Date of Service  1/4/2019            TYPE OF DISCHARGE: REGULAR               CONDITION AT DISCHARGE: improved       PROVISIONAL & DISCHARGE DIAGNOSES:    Problem List  Date Reviewed: 10/8/2015          Codes Class    Borderline personality disorder ([facility] ICD-10-CM: F60.3  ICD-9-CM: 301.83         * (Principal) Major depression, recurrent ([facility] ICD-10-CM: F33.9  ICD-9-CM: 296.30         Suicide attempt ([facility] ICD-10-CM: T14.91XA  ICD-9-CM: E958.9         Overdose ICD-10-CM: T50.901A  ICD-9-CM: 977.9, E980.5         Rhabdomyolysis ICD-10-CM: M62.82  ICD-9-CM: 728.88         Toxic noninfectious hepatitis ICD-10-CM: K71.6  ICD-9-CM: 573.3         Drug overdose, intentional ([facility] ICD-10-CM: T50.902A  ICD-9-CM: 977.9, E980.5         Tachycardia with 121 - 140 beats per minute ICD-10-CM: R00.0  ICD-9-CM: 785.0         Leukocytosis ICD-10-CM: D72.829  ICD-9-CM: 288.60         Myoclonus ICD-10-CM: G25.3  ICD-9-CM: 333.2         Depression (Chronic) ICD-10-CM: F32.9  ICD-9-CM: 311         Anxiety ICD-10-CM: F41.9  ICD-9-CM: 300.00               Active Hospital Problems    Borderline personality disorder ([facility]      *Major depression, recurrent ([facility]        DISCHARGE DIAGNOSIS:   Axis I:  SEE ABOVE  Axis II: SEE ABOVE  Axis III: SEE ABOVE  Axis IV:  Break up with GF  Axis V:  35 on admission, 55on discharge 70(baseline)       CC & HISTORY OF PRESENT ILLNESS:     REASON FOR HOSPITALIZATION:  CC:  Pt admitted under a voluntary basis for severe depression with suicidal attempt by overdosing on adderall pills. Pt trasfered from medical floor after being there for 2 days.     HISTORY OF PRESENT ILLNESS:    The patient, Bianca Leiva Faizan Amin, is a 26 y.o. WHITE OR  male with a past psychiatric history significant for history of anxiety and depression who presented to the ED after he overdosed on Adderall. Pt reports he was not feeling good after breakup with his girlfriend and decided to  end his life by taking too many pills, but then immediately realized that it was a mistake and told his mother. Pt reports he feels emptiness inspite fo many things going good in his life. Pt reports h/o impulisvity in the past too. Pt reports he feels he is not good enough as compare to his siblings. He states he has been depressed and anxious for years but more so these past few months. Pt reports difficulty managing his emotions. Pt became tearful talking about loosing his Counsellor of 3 years who killed himself and his parent's divorce in middle school which was difficult for him. Kun Lenz  currently denied suicidal/homicidal ideations and plans. Pt denied auditory and visual hallucinations, Pt reports recently being started on trintellex in December. Pt deneid OCD, manic Ptsd symptoms  Reports using marijuna daily and vaping. 1/4/19: Pt reports feeling much better, no side effects to meds reported. Pt is future oriented, agreeing to go for therapy. Family feels safe taking pt home. Pt wants o go home, regrets his suicidal attempts. Pt was able to verbalize his feeling including his feeing around his therapist committing suicide.        Past Psychiatric history: h/o depression, adhd, past meds vyvanse, ristalin, valium, xanax  Hospitalizations: no  Suicidal attempts: yes 3 years ago overdose was admitted to medical  Substance abuse: THC, vape     Family History of mental illness:none     Social History:  Patient is born and raised in [place]. No abuse. .. Patient is single. Pt is employed, support her/himself  Legal issues: none            SIDE EFFECTS: (reviewed/updated 1/4/2019)  None reported or admitted to.   No noted toxicity with use of Depakote/Tegretol/lithium/Clozaril/TCAs   ALLERGIES:(reviewed/updated 1/4/2019)  No Known Allergies        SOCIAL HISTORY:    Social History     Socioeconomic History    Marital status: SINGLE     Spouse name: Not on file    Number of children: Not on file    Years of education: Not on file    Highest education level: Not on file   Social Needs    Financial resource strain: Not on file    Food insecurity - worry: Not on file    Food insecurity - inability: Not on file    Transportation needs - medical: Not on file   in2nite needs - non-medical: Not on file   Occupational History    Not on file   Tobacco Use    Smoking status: Never Smoker    Smokeless tobacco: Never Used   Substance and Sexual Activity    Alcohol use: Yes     Comment: social    Drug use: Yes     Types: Marijuana    Sexual activity: Not on file   Other Topics Concern     Service Not Asked    Blood Transfusions Not Asked    Caffeine Concern Not Asked    Occupational Exposure Not Asked    Hobby Hazards Not Asked    Sleep Concern Not Asked    Stress Concern Not Asked    Weight Concern Not Asked    Special Diet Not Asked    Back Care Not Asked    Exercise Not Asked    Bike Helmet Not Asked   [address] Not Asked    Self-Exams Not Asked   Social History Narrative    Not on file      FAMILY HISTORY:   History reviewed. No pertinent family history. HOSPITALIZATION COURSE:    Clinton Burr was admitted to the inpatient psychiatric unit [facility] for acute psychiatric stabilization in regards to symptomatology as described in the HPI above. The differential diagnosis at time of admission included: Borderline personality d/o MDD recurrent   While on the unit Clinton Burr was involved in individual, group, occupational and milieu therapy. Psychiatric medications were adjusted during this hospitalization including effexor, atarax.    Clinton Burr demonstrated a low, but progressive improvement in overall condition. Much of patient's depression appeared to be related to situational stressors, effects of drugs of abuse, and psychological factors. Please see individual progress notes for more specific details regarding patient's hospitalization course. At time of discharge, Wolfgang Porter is without significant problems of depressionpsychosis  gamaliel. atient free of suicidal and homicidal ideations (appears to be at very low risk of suicide or homicide) and reports many positive predictive factors in terms of not attempting suicide or homicide. Overall presentation at time of discharge is most consistent with the diagnosis of  Borderline personality d/o MDD recurrentPatient with request for discharge today. There are no grounds to seek a TDO. [facility]. All members of the treatment team concur with each other in regards to plans for discharge today per patient's request.  Patient and family are aware and in agreement with discharge and discharge plan. r my last note: /4/19: Pt reports feeling much better, no side effects to meds reported. Pt is future oriented, agreeing to go for therapy. Family feels safe taking pt home. Pt wants o go home, regrets his suicidal attempts.  Pt was able to verbalize his feeling including his feeing around his therapist committing suicide.              LABS AND IMAGAING:    Labs Reviewed   LIPID PANEL   GLUCOSE, FASTING   TSH 3RD GENERATION     No results found for: DS35, PHEN, PHENO, PHENT, DILF, DS39, PHENY, PTN, VALF2, VALAC, VALP, VALPR, DS6, CRBAM, CRBAMP, CARB2, XCRBAM  Admission on 01/02/2019   Component Date Value Ref Range Status    LIPID PROFILE 01/03/2019        Final    Cholesterol, total 01/03/2019 116  <200 MG/DL Final    Triglyceride 01/03/2019 96  <150 MG/DL Final    HDL Cholesterol 01/03/2019 39  MG/DL Final    LDL, calculated 01/03/2019 57.8  0 - 100 MG/DL Final    VLDL, calculated 01/03/2019 19.2  MG/DL Final    CHOL/HDL Ratio 01/03/2019 3.0  0 - 5.0   Final    Glucose 01/03/2019 66  65 - 100 MG/DL Final    TSH 01/03/2019 1.19  0.36 - 3.74 uIU/mL Final   Admission on 12/30/2018, Discharged on 01/02/2019   Component Date Value Ref Range Status    SAMPLES BEING HELD 12/30/2018 1BLUE   Final    COMMENT 12/30/2018 Add-on orders for these samples will be processed based on acceptable specimen integrity and analyte stability, which may vary by analyte.     Final    Ventricular Rate 12/30/2018 103  BPM Final    Atrial Rate 12/30/2018 103  BPM Final    P-R Interval 12/30/2018 122  ms Final    QRS Duration 12/30/2018 82  ms Final    Q-T Interval 12/30/2018 296  ms Final    QTC Calculation (Bezet) 12/30/2018 387  ms Final    Calculated P Axis 12/30/2018 69  degrees Final    Calculated R Axis 12/30/2018 71  degrees Final    Calculated T Axis 12/30/2018 54  degrees Final    Diagnosis 12/30/2018    Final                    Value:Sinus tachycardia  No previous ECGs available  Confirmed by Sunita Le M.D., Jenny Emmanuel ([id number]) on 12/30/2018 9:48:46 AM      WBC 12/30/2018 11.4* 4.1 - 11.1 K/uL Final    RBC 12/30/2018 5.18  4.10 - 5.70 M/uL Final    HGB 12/30/2018 15.6  12.1 - 17.0 g/dL Final    HCT 12/30/2018 46.6  36.6 - 50.3 % Final    MCV 12/30/2018 90.0  80.0 - 99.0 FL Final    MCH 12/30/2018 30.1  26.0 - 34.0 PG Final    MCHC 12/30/2018 33.5  30.0 - 36.5 g/dL Final    RDW 12/30/2018 12.9  11.5 - 14.5 % Final    PLATELET 79/73/6498 797  150 - 400 K/uL Final    MPV 12/30/2018 10.8  8.9 - 12.9 FL Final    NRBC 12/30/2018 0.0  0  WBC Final    ABSOLUTE NRBC 12/30/2018 0.00  0.00 - 0.01 K/uL Final    NEUTROPHILS 12/30/2018 78* 32 - 75 % Final    LYMPHOCYTES 12/30/2018 11* 12 - 49 % Final    MONOCYTES 12/30/2018 11  5 - 13 % Final    EOSINOPHILS 12/30/2018 0  0 - 7 % Final    BASOPHILS 12/30/2018 0  0 - 1 % Final    IMMATURE GRANULOCYTES 12/30/2018 0 0.0 - 0.5 % Final    ABS. NEUTROPHILS 12/30/2018 8.8* 1.8 - 8.0 K/UL Final    ABS. LYMPHOCYTES 12/30/2018 1.2  0.8 - 3.5 K/UL Final    ABS. MONOCYTES 12/30/2018 1.2* 0.0 - 1.0 K/UL Final    ABS. EOSINOPHILS 12/30/2018 0.0  0.0 - 0.4 K/UL Final    ABS. BASOPHILS 12/30/2018 0.0  0.0 - 0.1 K/UL Final    ABS. IMM. GRANS. 12/30/2018 0.0  0.00 - 0.04 K/UL Final    DF 12/30/2018 AUTOMATED    Final    Sodium 12/30/2018 135* 136 - 145 mmol/L Final    Potassium 12/30/2018 3.7  3.5 - 5.1 mmol/L Final    Chloride 12/30/2018 102  97 - 108 mmol/L Final    CO2 12/30/2018 25  21 - 32 mmol/L Final    Anion gap 12/30/2018 8  5 - 15 mmol/L Final    Glucose 12/30/2018 105* 65 - 100 mg/dL Final    BUN 12/30/2018 12  6 - 20 MG/DL Final    Creatinine 12/30/2018 0.92  0.70 - 1.30 MG/DL Final    BUN/Creatinine ratio 12/30/2018 13  12 - 20   Final    GFR est AA 12/30/2018 >60  >60 ml/min/1.73m2 Final    GFR est non-AA 12/30/2018 >60  >60 ml/min/1.73m2 Final    Calcium 12/30/2018 8.8  8.5 - 10.1 MG/DL Final    Bilirubin, total 12/30/2018 0.7  0.2 - 1.0 MG/DL Final    ALT (SGPT) 12/30/2018 19  12 - 78 U/L Final    AST (SGOT) 12/30/2018 14* 15 - 37 U/L Final    Alk.  phosphatase 12/30/2018 98  45 - 117 U/L Final    Protein, total 12/30/2018 8.0  6.4 - 8.2 g/dL Final    Albumin 12/30/2018 4.2  3.5 - 5.0 g/dL Final    Globulin 12/30/2018 3.8  2.0 - 4.0 g/dL Final    A-G Ratio 12/30/2018 1.1  1.1 - 2.2   Final    AMPHETAMINES 12/30/2018 POSITIVE* NEG   Final    BARBITURATES 12/30/2018 NEGATIVE   NEG   Final    BENZODIAZEPINES 12/30/2018 POSITIVE* NEG   Final    COCAINE 12/30/2018 NEGATIVE   NEG   Final    METHADONE 12/30/2018 NEGATIVE   NEG   Final    OPIATES 12/30/2018 NEGATIVE   NEG   Final    PCP(PHENCYCLIDINE) 12/30/2018 NEGATIVE   NEG   Final    THC (TH-CANNABINOL) 12/30/2018 POSITIVE* NEG   Final    Drug screen comment 12/30/2018 (NOTE)   Final    Magnesium 12/30/2018 1.9  1.6 - 2.4 mg/dL Final    ALCOHOL(ETHYL),SERUM 12/30/2018 <10  <10 MG/DL Final    Acetaminophen level 12/30/2018 <2* 10 - 30 ug/mL Final    Salicylate level 83/64/5621 <1.7* 2.8 - 20.0 MG/DL Final    CK 12/30/2018 226  39 - 308 U/L Final    Sodium 01/01/2019 139  136 - 145 mmol/L Final    Potassium 01/01/2019 3.9  3.5 - 5.1 mmol/L Final    Chloride 01/01/2019 102  97 - 108 mmol/L Final    CO2 01/01/2019 30  21 - 32 mmol/L Final    Anion gap 01/01/2019 7  5 - 15 mmol/L Final    Glucose 01/01/2019 91  65 - 100 mg/dL Final    BUN 01/01/2019 12  6 - 20 MG/DL Final    Creatinine 01/01/2019 1.02  0.70 - 1.30 MG/DL Final    BUN/Creatinine ratio 01/01/2019 12  12 - 20   Final    GFR est AA 01/01/2019 >60  >60 ml/min/1.73m2 Final    GFR est non-AA 01/01/2019 >60  >60 ml/min/1.73m2 Final    Calcium 01/01/2019 9.4  8.5 - 10.1 MG/DL Final    Protein, total 01/01/2019 7.9  6.4 - 8.2 g/dL Final    Albumin 01/01/2019 4.2  3.5 - 5.0 g/dL Final    Globulin 01/01/2019 3.7  2.0 - 4.0 g/dL Final    A-G Ratio 01/01/2019 1.1  1.1 - 2.2   Final    Bilirubin, total 01/01/2019 0.6  0.2 - 1.0 MG/DL Final    Bilirubin, direct 01/01/2019 0.2  0.0 - 0.2 MG/DL Final    Alk.  phosphatase 01/01/2019 97  45 - 117 U/L Final    AST (SGOT) 01/01/2019 14* 15 - 37 U/L Final    ALT (SGPT) 01/01/2019 18  12 - 78 U/L Final    WBC 01/01/2019 10.2  4.1 - 11.1 K/uL Final    RBC 01/01/2019 5.64  4.10 - 5.70 M/uL Final    HGB 01/01/2019 17.5* 12.1 - 17.0 g/dL Final    HCT 01/01/2019 51.6* 36.6 - 50.3 % Final    MCV 01/01/2019 91.5  80.0 - 99.0 FL Final    MCH 01/01/2019 31.0  26.0 - 34.0 PG Final    MCHC 01/01/2019 33.9  30.0 - 36.5 g/dL Final    RDW 01/01/2019 12.9  11.5 - 14.5 % Final    PLATELET 84/47/9520 825  150 - 400 K/uL Final    MPV 01/01/2019 11.1  8.9 - 12.9 FL Final    NRBC 01/01/2019 0.0  0  WBC Final    ABSOLUTE NRBC 01/01/2019 0.00  0.00 - 0.01 K/uL Final    NEUTROPHILS 01/01/2019 58  32 - 75 % Final    LYMPHOCYTES 01/01/2019 31 12 - 49 % Final    MONOCYTES 01/01/2019 9  5 - 13 % Final    EOSINOPHILS 01/01/2019 1  0 - 7 % Final    BASOPHILS 01/01/2019 1  0 - 1 % Final    IMMATURE GRANULOCYTES 01/01/2019 0  0.0 - 0.5 % Final    ABS. NEUTROPHILS 01/01/2019 5.9  1.8 - 8.0 K/UL Final    ABS. LYMPHOCYTES 01/01/2019 3.2  0.8 - 3.5 K/UL Final    ABS. MONOCYTES 01/01/2019 0.9  0.0 - 1.0 K/UL Final    ABS. EOSINOPHILS 01/01/2019 0.1  0.0 - 0.4 K/UL Final    ABS. BASOPHILS 01/01/2019 0.1  0.0 - 0.1 K/UL Final    ABS. IMM. GRANS. 01/01/2019 0.0  0.00 - 0.04 K/UL Final    DF 01/01/2019 AUTOMATED    Final    INR 01/01/2019 1.1  0.9 - 1.1   Final    Prothrombin time 01/01/2019 10.8  9.0 - 11.1 sec Final     No results found. DISPOSITION:    Home. Patient to f/u with drug/etoh rehabilitation, psychiatric, nd psychotherapy appointments. Patient is to f/u with internist as directed. Patient should have a associated labs checked within the next 1-2 weeks by patient's o/p psychiatrist/internist.               FOLLOW-UP CARE:      Wound Care: none needed. Follow-up Information     Follow up With Specialties Details Why Contact Info    Dr Boy Duarte   On 1/8/2019 Time of Your Appt: 8:30am [address]  # [phone]    [facility]  [address] [facility], [address] [place] [address] Ave  [phone]    Gaston Dubose MD Pediatrics   [address]  [phone]                   PROGNOSIS:   Nile Cleaves / Ly Idol / Bo Blakes / Poor---- based on nature of patient's pathology/ies nd treatment compliance issues. Prognosis is greatly dependent upon patient's ability to remain sober and to follow up with drug/etoh rehabilitation and psychiatric/psychotherapy appointments as well as to comply with psychiatric medications as prescribed. DISCHARGE MEDICATIONS: (no changes made).     Informed consent given for the use of following psychotropic medications:  Current Discharge Medication List      START taking these medications    Details   hydrOXYzine HCl (ATARAX) 25 mg tablet Take 1 Tab by mouth two (2) times daily as needed for Anxiety for up to 10 days. Qty: 30 Tab, Refills: 0      venlafaxine-SR (EFFEXOR-XR) 37.5 mg capsule Take 1 Cap by mouth daily (with breakfast). Qty: 30 Cap, Refills: 0         STOP taking these medications       dextroamphetamine-amphetamine (ADDERALL) 5 mg tablet Comments:   Reason for Stopping:         vortioxetine (TRINTELLIX) 10 mg tablet Comments:   Reason for Stopping:         ALPRAZolam (XANAX) 0.5 mg tablet Comments:   Reason for Stopping:         QUEtiapine (SEROQUEL) 25 mg tablet Comments:   Reason for Stopping:                      A coordinated, multidisplinary treatment team round was conducted with Anushka Blanton---this is done daily here at [facility]. This team consists of the nurse, psychiatric unit pharmcist,  and writer. I have spent greater than 35 minutes on discharge work.     Signed:  Benito Bruce MD  1/4/2019

## 2019-01-04 NOTE — INTERDISCIPLINARY ROUNDS
Behavioral Health Interdisciplinary Rounds Patient Name: Melanie Milian  Age: 26 y.o. Room/Bed:  731/02 Primary Diagnosis: Major depression, recurrent ([facility] Admission Status: Voluntary Readmission within 30 days: no 
Power of  in place: no 
Patient requires a blocked bed: no          Reason for blocked bed: VTE Prophylaxis: No 
 
Mobility needs/Fall risk: no 
Flu Vaccine : no  
Nutritional Plan: no 
Consults:         
Labs/Testing due today?: no 
 
Sleep hours: 8 Participation in Care/Groups:  yes Medication Compliant?: Yes PRNS (last 24 hours): Antianxiety Restraints (last 24 hours):  no 
  
CIWA (range last 24 hours): COWS (range last 24 hours): Alcohol screening (AUDIT) completed -   AUDIT Score: 4 If applicable, date SBIRT discussed in treatment team AND documented:  
 
Tobacco - patient is a smoker: Have You Used Tobacco in the Past 30 Days: Yes Illegal Drugs use: Have You Used Any Illegal Substances Over the Past 12 Months: Yes 
 
24 hour chart check complete: yes Patient goal(s) for today: Discharge Treatment team focus/goals: Schedule follow-up; discharge Progress note: Pt is stable and ready for discharge LOS:  2  Expected LOS: 2 Financial concerns/prescription coverage: Ira Alvarado Date of last family contact: 1/4 SW spoke to [facility] requesting physician contact today: No 
Discharge plan: Return home Guns in the home: No       
Outpatient provider(s): To be linked to new provider Participating treatment team members: LIBERTY Proctor; Isabell Patel, [address]; Dr. Juma Boston MD; Regina Bey RN
